# Patient Record
Sex: MALE | URBAN - METROPOLITAN AREA
[De-identification: names, ages, dates, MRNs, and addresses within clinical notes are randomized per-mention and may not be internally consistent; named-entity substitution may affect disease eponyms.]

---

## 2018-11-12 PROBLEM — Z00.00 ENCOUNTER FOR PREVENTIVE HEALTH EXAMINATION: Status: ACTIVE | Noted: 2018-11-12

## 2019-03-15 ENCOUNTER — EMERGENCY (EMERGENCY)
Facility: HOSPITAL | Age: 54
LOS: 1 days | Discharge: HOME | End: 2019-03-15
Attending: EMERGENCY MEDICINE
Payer: COMMERCIAL

## 2019-03-15 VITALS
DIASTOLIC BLOOD PRESSURE: 105 MMHG | TEMPERATURE: 98 F | HEART RATE: 88 BPM | WEIGHT: 160.06 LBS | RESPIRATION RATE: 19 BRPM | SYSTOLIC BLOOD PRESSURE: 153 MMHG | OXYGEN SATURATION: 97 %

## 2019-03-15 VITALS
DIASTOLIC BLOOD PRESSURE: 91 MMHG | SYSTOLIC BLOOD PRESSURE: 141 MMHG | TEMPERATURE: 98 F | OXYGEN SATURATION: 99 % | HEART RATE: 84 BPM | RESPIRATION RATE: 20 BRPM

## 2019-03-15 LAB
ALBUMIN SERPL ELPH-MCNC: 3.9 G/DL — SIGNIFICANT CHANGE UP (ref 3.3–5)
ALP SERPL-CCNC: 43 U/L — SIGNIFICANT CHANGE UP (ref 40–120)
ALT FLD-CCNC: 12 U/L — SIGNIFICANT CHANGE UP (ref 10–45)
ANION GAP SERPL CALC-SCNC: 13 MMOL/L — SIGNIFICANT CHANGE UP (ref 5–17)
APTT BLD: 30.8 SEC — SIGNIFICANT CHANGE UP (ref 27.5–36.3)
AST SERPL-CCNC: 16 U/L — SIGNIFICANT CHANGE UP (ref 10–40)
BASOPHILS # BLD AUTO: 0.1 K/UL — SIGNIFICANT CHANGE UP (ref 0–0.2)
BASOPHILS NFR BLD AUTO: 1.2 % — SIGNIFICANT CHANGE UP (ref 0–2)
BILIRUB SERPL-MCNC: 0.2 MG/DL — SIGNIFICANT CHANGE UP (ref 0.2–1.2)
BUN SERPL-MCNC: 22 MG/DL — SIGNIFICANT CHANGE UP (ref 7–23)
CALCIUM SERPL-MCNC: 9.8 MG/DL — SIGNIFICANT CHANGE UP (ref 8.4–10.5)
CHLORIDE SERPL-SCNC: 103 MMOL/L — SIGNIFICANT CHANGE UP (ref 96–108)
CK SERPL-CCNC: 39 U/L — SIGNIFICANT CHANGE UP (ref 30–200)
CO2 SERPL-SCNC: 22 MMOL/L — SIGNIFICANT CHANGE UP (ref 22–31)
CREAT SERPL-MCNC: 2.15 MG/DL — HIGH (ref 0.5–1.3)
EOSINOPHIL # BLD AUTO: 0.1 K/UL — SIGNIFICANT CHANGE UP (ref 0–0.5)
EOSINOPHIL NFR BLD AUTO: 1.9 % — SIGNIFICANT CHANGE UP (ref 0–6)
GLUCOSE SERPL-MCNC: 106 MG/DL — HIGH (ref 70–99)
HCT VFR BLD CALC: 45.7 % — SIGNIFICANT CHANGE UP (ref 39–50)
HGB BLD-MCNC: 15.5 G/DL — SIGNIFICANT CHANGE UP (ref 13–17)
INR BLD: 0.95 RATIO — SIGNIFICANT CHANGE UP (ref 0.88–1.16)
LYMPHOCYTES # BLD AUTO: 2.2 K/UL — SIGNIFICANT CHANGE UP (ref 1–3.3)
LYMPHOCYTES # BLD AUTO: 29.8 % — SIGNIFICANT CHANGE UP (ref 13–44)
MCHC RBC-ENTMCNC: 28.2 PG — SIGNIFICANT CHANGE UP (ref 27–34)
MCHC RBC-ENTMCNC: 33.9 GM/DL — SIGNIFICANT CHANGE UP (ref 32–36)
MCV RBC AUTO: 83.1 FL — SIGNIFICANT CHANGE UP (ref 80–100)
MONOCYTES # BLD AUTO: 0.4 K/UL — SIGNIFICANT CHANGE UP (ref 0–0.9)
MONOCYTES NFR BLD AUTO: 5.9 % — SIGNIFICANT CHANGE UP (ref 2–14)
NEUTROPHILS # BLD AUTO: 4.4 K/UL — SIGNIFICANT CHANGE UP (ref 1.8–7.4)
NEUTROPHILS NFR BLD AUTO: 61.2 % — SIGNIFICANT CHANGE UP (ref 43–77)
NT-PROBNP SERPL-SCNC: 271 PG/ML — SIGNIFICANT CHANGE UP (ref 0–300)
PLATELET # BLD AUTO: 267 K/UL — SIGNIFICANT CHANGE UP (ref 150–400)
POTASSIUM SERPL-MCNC: 4.6 MMOL/L — SIGNIFICANT CHANGE UP (ref 3.5–5.3)
POTASSIUM SERPL-SCNC: 4.6 MMOL/L — SIGNIFICANT CHANGE UP (ref 3.5–5.3)
PROT SERPL-MCNC: 6.6 G/DL — SIGNIFICANT CHANGE UP (ref 6–8.3)
PROTHROM AB SERPL-ACNC: 10.8 SEC — SIGNIFICANT CHANGE UP (ref 10–12.9)
RBC # BLD: 5.5 M/UL — SIGNIFICANT CHANGE UP (ref 4.2–5.8)
RBC # FLD: 12.9 % — SIGNIFICANT CHANGE UP (ref 10.3–14.5)
SODIUM SERPL-SCNC: 138 MMOL/L — SIGNIFICANT CHANGE UP (ref 135–145)
TROPONIN T, HIGH SENSITIVITY RESULT: 12 NG/L — SIGNIFICANT CHANGE UP (ref 0–51)
TROPONIN T, HIGH SENSITIVITY RESULT: 12 NG/L — SIGNIFICANT CHANGE UP (ref 0–51)
WBC # BLD: 7.2 K/UL — SIGNIFICANT CHANGE UP (ref 3.8–10.5)
WBC # FLD AUTO: 7.2 K/UL — SIGNIFICANT CHANGE UP (ref 3.8–10.5)

## 2019-03-15 PROCEDURE — 94640 AIRWAY INHALATION TREATMENT: CPT

## 2019-03-15 PROCEDURE — 85730 THROMBOPLASTIN TIME PARTIAL: CPT

## 2019-03-15 PROCEDURE — 71045 X-RAY EXAM CHEST 1 VIEW: CPT | Mod: 26

## 2019-03-15 PROCEDURE — 85027 COMPLETE CBC AUTOMATED: CPT

## 2019-03-15 PROCEDURE — 85610 PROTHROMBIN TIME: CPT

## 2019-03-15 PROCEDURE — 84484 ASSAY OF TROPONIN QUANT: CPT

## 2019-03-15 PROCEDURE — 93010 ELECTROCARDIOGRAM REPORT: CPT

## 2019-03-15 PROCEDURE — 71045 X-RAY EXAM CHEST 1 VIEW: CPT

## 2019-03-15 PROCEDURE — 93005 ELECTROCARDIOGRAM TRACING: CPT

## 2019-03-15 PROCEDURE — 82550 ASSAY OF CK (CPK): CPT

## 2019-03-15 PROCEDURE — 99285 EMERGENCY DEPT VISIT HI MDM: CPT | Mod: 25

## 2019-03-15 PROCEDURE — 80053 COMPREHEN METABOLIC PANEL: CPT

## 2019-03-15 PROCEDURE — 99284 EMERGENCY DEPT VISIT MOD MDM: CPT | Mod: 25

## 2019-03-15 PROCEDURE — 83880 ASSAY OF NATRIURETIC PEPTIDE: CPT

## 2019-03-15 RX ORDER — IPRATROPIUM/ALBUTEROL SULFATE 18-103MCG
3 AEROSOL WITH ADAPTER (GRAM) INHALATION ONCE
Qty: 0 | Refills: 0 | Status: COMPLETED | OUTPATIENT
Start: 2019-03-15 | End: 2019-03-15

## 2019-03-15 RX ADMIN — Medication 3 MILLILITER(S): at 16:17

## 2019-03-15 NOTE — ED PROVIDER NOTE - NSFOLLOWUPCLINICS_GEN_ALL_ED_FT
[facility]  Cardiology  [address] [place] [address]  Phone: [phone]  Fax:   Follow Up Time: 1-3 Days

## 2019-03-15 NOTE — ED PROVIDER NOTE - NSFOLLOWUPINSTRUCTIONS_ED_ALL_ED_FT
This is a recent snapshot of the patient's [facility] medical record.  For current drug dose and complete information and questions, call [phone] or In Basket pool, fv home infusion ([id number])  CSN Number:  [id number]      
Follow up with your cardiologist tomorrow or ours. return to the ER Immediately for worsening pain, shortness of breath, or weakness. STOP SMOKING

## 2019-03-15 NOTE — ED PROVIDER NOTE - PROGRESS NOTE DETAILS
MEds: Janumet, Crestor, Fenofibrate, Glipizide, Clonidine, Losartan, Plavix, Flomax, Dr. James Jones, [phone]. Depakote 250 mg, Benztropine 1 mg, Aristada 662 mg inj Dr. Rich Martinez, [phone] discussed results with patient. Discussed with patient that we would like to admit him for ACS work up. That even though his troponin is negative he can still need a stent. Patient would like to be discharged. Patient feeling better.  Will call his cardiologist tomorrow. Discussed reasons to return Immediately. Understands risk

## 2019-03-15 NOTE — ED PROVIDER NOTE - ATTENDING CONTRIBUTION TO CARE
Patient is a DM, HTN, hyperlipidemia, CAd with hx of MI here for chest pain since last night. He states he woke up with chest pain and it resolved. Pain was left sided, sharp.       heavy smoker, heavy coffe drinker Patient is a DM, HTN, hyperlipidemia, CAd with hx of MI here for chest pain since last night around 2:30/3 pm, lasted about 5 minutes. Pain was left sided, sharp. He went to a doctor today and was told to come to the emergency department. Patient is a heavy smoker, smokes about 1.5 ppd and admits to being non-compliant with medications. he is c/o feeling short of breath with walking. Denies fevers.     VS noted  Gen. no acute distress, Non toxic   HEENT: EOMI, mmm,   Lungs: poor air entry, mild wheeze  CVS: RRR   Abd; Soft non tender, non distended   Ext: no edema  Skin: no rash  Neuro AAOx3 non focal clear speech  a/p: chest pain - resolved . Will check ekg, labs including trop. sob - suspect related to smoking, likely copd/ bronchitis. Will try nebs, check CXR. Given risk factors and non-compliance, would recommend admission for cardiac workup and testing.   - Nima LOVING

## 2019-03-15 NOTE — ED PROVIDER NOTE - CLINICAL SUMMARY MEDICAL DECISION MAKING FREE TEXT BOX
52 yo M heavy smoker with pmhx MI, stents(CAD), dm, htn, and hld presenting with chest pain x last night. Patient currently asymptomatic. Patient given ASA at urgent care. Will obtain ekg, cxr, cardiac monitoring, and labs

## 2019-03-15 NOTE — ED PROVIDER NOTE - OBJECTIVE STATEMENT
54 yo M heavy smoker with pmhx MI, stents(CAD), dm, htn, and hld presenting with chest pain x last night. Patient states he began in the middle of the night with left sided chest pain that was sharp in nature. Patient states he was able to relax and the pain went away and he went back to sleep. Patient states he drank "a lot of coffee and smoked a lot" this morning and the pain began again. Patient went to urgent care and was given a full asa. Patient states he took all his medication last night. Patient currently asymptomatic. Patient admits to cough but always coughs. Patient denies fever, sob. neck pain, back pain, tingling, numbness, weakness, abd pain, nvd, dysuria, hematuria, calf pain and edema 54 yo M heavy smoker with pmhx MI, stents(CAD), dm, htn, and hld presenting with chest pain x last night. Patient states he began in the middle of the night with left sided chest pain that was sharp in nature. Patient states he was able to relax and the pain went away and he went back to sleep. Patient states he drank "a lot of coffee and smoked a lot" this morning and the pain began again. Patient went to urgent care and was given a full asa. Patient states he took all his medication last night. Patient currently asymptomatic. Patient admits to cough but always coughs. Patient also admits to sob worse on exertion. Patient denies fever,  neck pain, back pain, tingling, numbness, weakness, abd pain, nvd, dysuria, hematuria, calf pain and edema

## 2019-03-15 NOTE — ED ADULT NURSE NOTE - NSIMPLEMENTINTERV_GEN_ALL_ED
Implemented All Fall with Harm Risk Interventions:  [place] to call system. Call bell, personal items and telephone within reach. Instruct patient to call for assistance. Room bathroom lighting operational. Non-slip footwear when patient is off stretcher. Physically safe environment: no spills, clutter or unnecessary equipment. Stretcher in lowest position, wheels locked, appropriate side rails in place. Provide visual cue, wrist band, yellow gown, etc. Monitor gait and stability. Monitor for mental status changes and reorient to person, place, and time. Review medications for side effects contributing to fall risk. Reinforce activity limits and safety measures with patient and family. Provide visual clues: red socks.

## 2019-03-15 NOTE — ED ADULT NURSE NOTE - OBJECTIVE STATEMENT
53y male brought in by EMS from [facility] c/o chest pain. A&Ox3 and hypertensive. [place] speaking. Hx of HTN, DM, HLD, BPH and MI and cardiac arrest. Pt report sharp non-radiating left sided chest pain last night. Went to sleep, woke up in the AM and had coffee. Then noted dull left sided chest pain and palpitations. Pt presented to [facility] where found to be hypertensive 230/120 and tachycardic 120bpm. [facility] place 20g IV in RAC and administer 325mg PO aspirin. Denies sob/diaphoresis and n/v/d. EKG performed. Cardiac monitor applied.

## 2024-04-29 PROBLEM — I25.10 ATHEROSCLEROTIC HEART DISEASE OF NATIVE CORONARY ARTERY WITHOUT ANGINA PECTORIS: Chronic | Status: ACTIVE | Noted: 2019-03-19

## 2024-04-29 PROBLEM — E11.9 TYPE 2 DIABETES MELLITUS WITHOUT COMPLICATIONS: Chronic | Status: ACTIVE | Noted: 2019-03-19

## 2024-04-29 PROBLEM — E78.5 HYPERLIPIDEMIA, UNSPECIFIED: Chronic | Status: ACTIVE | Noted: 2019-03-19

## 2024-04-29 PROBLEM — I10 ESSENTIAL (PRIMARY) HYPERTENSION: Chronic | Status: ACTIVE | Noted: 2019-03-19

## 2024-06-19 ENCOUNTER — APPOINTMENT (OUTPATIENT)
Dept: OTOLARYNGOLOGY | Facility: CLINIC | Age: 59
End: 2024-06-19

## 2024-07-03 ENCOUNTER — NON-APPOINTMENT (OUTPATIENT)
Age: 59
End: 2024-07-03

## 2024-07-31 ENCOUNTER — APPOINTMENT (OUTPATIENT)
Dept: OTOLARYNGOLOGY | Facility: CLINIC | Age: 59
End: 2024-07-31

## 2025-07-12 ENCOUNTER — INPATIENT (INPATIENT)
Facility: HOSPITAL | Age: 60
LOS: 3 days | Discharge: HOME HEALTH | End: 2025-07-16
Attending: INTERNAL MEDICINE | Admitting: INTERNAL MEDICINE
Payer: MEDICARE

## 2025-07-12 ENCOUNTER — TRANSCRIPTION ENCOUNTER (OUTPATIENT)
Age: 60
End: 2025-07-12

## 2025-07-12 ENCOUNTER — RESULT REVIEW (OUTPATIENT)
Age: 60
End: 2025-07-12

## 2025-07-12 VITALS
WEIGHT: 100.09 LBS | HEART RATE: 130 BPM | HEIGHT: 66 IN | DIASTOLIC BLOOD PRESSURE: 148 MMHG | OXYGEN SATURATION: 88 % | RESPIRATION RATE: 35 BRPM | SYSTOLIC BLOOD PRESSURE: 247 MMHG | TEMPERATURE: 102 F

## 2025-07-12 LAB
ALBUMIN SERPL ELPH-MCNC: 4 G/DL — SIGNIFICANT CHANGE UP (ref 3.3–5)
ALP SERPL-CCNC: 87 U/L — SIGNIFICANT CHANGE UP (ref 40–120)
ALT FLD-CCNC: 20 U/L — SIGNIFICANT CHANGE UP (ref 12–78)
ANION GAP SERPL CALC-SCNC: 12 MMOL/L — SIGNIFICANT CHANGE UP (ref 5–17)
ANION GAP SERPL CALC-SCNC: 9 MMOL/L — SIGNIFICANT CHANGE UP (ref 5–17)
ANISOCYTOSIS BLD QL: SLIGHT — SIGNIFICANT CHANGE UP
APTT BLD: 32.9 SEC — SIGNIFICANT CHANGE UP (ref 26.1–36.8)
AST SERPL-CCNC: 17 U/L — SIGNIFICANT CHANGE UP (ref 15–37)
BASE EXCESS BLDA CALC-SCNC: -0.7 MMOL/L — SIGNIFICANT CHANGE UP (ref -2–3)
BASOPHILS # BLD AUTO: 0 K/UL — SIGNIFICANT CHANGE UP (ref 0–0.2)
BASOPHILS NFR BLD AUTO: 0 % — SIGNIFICANT CHANGE UP (ref 0–2)
BILIRUB SERPL-MCNC: 0.4 MG/DL — SIGNIFICANT CHANGE UP (ref 0.2–1.2)
BLOOD GAS COMMENTS ARTERIAL: SIGNIFICANT CHANGE UP
BUN SERPL-MCNC: 22 MG/DL — SIGNIFICANT CHANGE UP (ref 7–23)
BUN SERPL-MCNC: 23 MG/DL — SIGNIFICANT CHANGE UP (ref 7–23)
CALCIUM SERPL-MCNC: 8.5 MG/DL — SIGNIFICANT CHANGE UP (ref 8.5–10.1)
CALCIUM SERPL-MCNC: 9.9 MG/DL — SIGNIFICANT CHANGE UP (ref 8.5–10.1)
CHLORIDE SERPL-SCNC: 97 MMOL/L — SIGNIFICANT CHANGE UP (ref 96–108)
CHLORIDE SERPL-SCNC: 97 MMOL/L — SIGNIFICANT CHANGE UP (ref 96–108)
CO2 BLDA-SCNC: 29 MMOL/L — HIGH (ref 19–24)
CO2 SERPL-SCNC: 25 MMOL/L — SIGNIFICANT CHANGE UP (ref 22–31)
CO2 SERPL-SCNC: 25 MMOL/L — SIGNIFICANT CHANGE UP (ref 22–31)
CREAT SERPL-MCNC: 6.22 MG/DL — HIGH (ref 0.5–1.3)
CREAT SERPL-MCNC: 6.33 MG/DL — HIGH (ref 0.5–1.3)
EGFR: 10 ML/MIN/1.73M2 — LOW
EGFR: 10 ML/MIN/1.73M2 — LOW
EGFR: 9 ML/MIN/1.73M2 — LOW
EGFR: 9 ML/MIN/1.73M2 — LOW
EOSINOPHIL # BLD AUTO: 0.51 K/UL — HIGH (ref 0–0.5)
EOSINOPHIL NFR BLD AUTO: 5 % — SIGNIFICANT CHANGE UP (ref 0–6)
FLUAV AG NPH QL: SIGNIFICANT CHANGE UP
FLUBV AG NPH QL: SIGNIFICANT CHANGE UP
GAS PNL BLDA: SIGNIFICANT CHANGE UP
GAS PNL BLDA: SIGNIFICANT CHANGE UP
GLUCOSE SERPL-MCNC: 124 MG/DL — HIGH (ref 70–99)
GLUCOSE SERPL-MCNC: 125 MG/DL — HIGH (ref 70–99)
GRAM STN FLD: ABNORMAL
HCO3 BLDA-SCNC: 35 MMOL/L — HIGH (ref 21–28)
HCT VFR BLD CALC: 44.6 % — SIGNIFICANT CHANGE UP (ref 39–50)
HGB BLD-MCNC: 14.1 G/DL — SIGNIFICANT CHANGE UP (ref 13–17)
HOROWITZ INDEX BLDA+IHG-RTO: 0.6 — SIGNIFICANT CHANGE UP
INR BLD: 0.94 RATIO — SIGNIFICANT CHANGE UP (ref 0.85–1.16)
LACTATE SERPL-SCNC: 2.2 MMOL/L — HIGH (ref 0.7–2)
LACTATE SERPL-SCNC: 5.4 MMOL/L — CRITICAL HIGH (ref 0.7–2)
LYMPHOCYTES # BLD AUTO: 4.89 K/UL — HIGH (ref 1–3.3)
LYMPHOCYTES # BLD AUTO: 48 % — HIGH (ref 13–44)
MAGNESIUM SERPL-MCNC: 2.4 MG/DL — SIGNIFICANT CHANGE UP (ref 1.6–2.6)
MANUAL SMEAR VERIFICATION: SIGNIFICANT CHANGE UP
MCHC RBC-ENTMCNC: 28.8 PG — SIGNIFICANT CHANGE UP (ref 27–34)
MCHC RBC-ENTMCNC: 31.6 G/DL — LOW (ref 32–36)
MCV RBC AUTO: 91.2 FL — SIGNIFICANT CHANGE UP (ref 80–100)
MICROCYTES BLD QL: SLIGHT — SIGNIFICANT CHANGE UP
MONOCYTES # BLD AUTO: 0.31 K/UL — SIGNIFICANT CHANGE UP (ref 0–0.9)
MONOCYTES NFR BLD AUTO: 3 % — SIGNIFICANT CHANGE UP (ref 2–14)
MRSA PCR RESULT.: SIGNIFICANT CHANGE UP
NEUTROPHILS # BLD AUTO: 4.48 K/UL — SIGNIFICANT CHANGE UP (ref 1.8–7.4)
NEUTROPHILS NFR BLD AUTO: 44 % — SIGNIFICANT CHANGE UP (ref 43–77)
NRBC # BLD: 0 /100 WBCS — SIGNIFICANT CHANGE UP (ref 0–0)
NRBC BLD AUTO-RTO: SIGNIFICANT CHANGE UP /100 WBCS (ref 0–0)
NRBC BLD-RTO: 0 /100 WBCS — SIGNIFICANT CHANGE UP (ref 0–0)
NT-PROBNP SERPL-SCNC: HIGH PG/ML (ref 0–125)
PCO2 BLDA: 61 MMHG — HIGH (ref 32–46)
PH BLDA: 7.26 — LOW (ref 7.35–7.45)
PHOSPHATE SERPL-MCNC: 4 MG/DL — SIGNIFICANT CHANGE UP (ref 2.5–4.5)
PLAT MORPH BLD: NORMAL — SIGNIFICANT CHANGE UP
PLATELET # BLD AUTO: 216 K/UL — SIGNIFICANT CHANGE UP (ref 150–400)
PO2 BLDA: 69 MMHG — LOW (ref 83–108)
POTASSIUM SERPL-MCNC: 5.5 MMOL/L — HIGH (ref 3.5–5.3)
POTASSIUM SERPL-MCNC: 5.7 MMOL/L — HIGH (ref 3.5–5.3)
POTASSIUM SERPL-SCNC: 5.5 MMOL/L — HIGH (ref 3.5–5.3)
POTASSIUM SERPL-SCNC: 5.7 MMOL/L — HIGH (ref 3.5–5.3)
PROT SERPL-MCNC: 8.5 GM/DL — HIGH (ref 6–8.3)
PROTHROM AB SERPL-ACNC: 10.9 SEC — SIGNIFICANT CHANGE UP (ref 9.9–13.4)
RBC # BLD: 4.89 M/UL — SIGNIFICANT CHANGE UP (ref 4.2–5.8)
RBC # FLD: 14.5 % — SIGNIFICANT CHANGE UP (ref 10.3–14.5)
RBC BLD AUTO: ABNORMAL
RSV RNA NPH QL NAA+NON-PROBE: SIGNIFICANT CHANGE UP
S AUREUS DNA NOSE QL NAA+PROBE: SIGNIFICANT CHANGE UP
SAO2 % BLDA: 93 % — LOW (ref 94–98)
SARS-COV-2 RNA SPEC QL NAA+PROBE: SIGNIFICANT CHANGE UP
SODIUM SERPL-SCNC: 131 MMOL/L — LOW (ref 135–145)
SODIUM SERPL-SCNC: 134 MMOL/L — LOW (ref 135–145)
SOURCE RESPIRATORY: SIGNIFICANT CHANGE UP
SPECIMEN SOURCE: SIGNIFICANT CHANGE UP
TROPONIN I, HIGH SENSITIVITY RESULT: 106.6 NG/L — HIGH
TROPONIN I, HIGH SENSITIVITY RESULT: 27.4 NG/L — SIGNIFICANT CHANGE UP
WBC # BLD: 10.18 K/UL — SIGNIFICANT CHANGE UP (ref 3.8–10.5)
WBC # FLD AUTO: 10.18 K/UL — SIGNIFICANT CHANGE UP (ref 3.8–10.5)

## 2025-07-12 PROCEDURE — 99291 CRITICAL CARE FIRST HOUR: CPT

## 2025-07-12 PROCEDURE — 31500 INSERT EMERGENCY AIRWAY: CPT

## 2025-07-12 PROCEDURE — 93306 TTE W/DOPPLER COMPLETE: CPT | Mod: 26

## 2025-07-12 PROCEDURE — 70450 CT HEAD/BRAIN W/O DYE: CPT | Mod: 26

## 2025-07-12 PROCEDURE — 99291 CRITICAL CARE FIRST HOUR: CPT | Mod: 25

## 2025-07-12 PROCEDURE — 71045 X-RAY EXAM CHEST 1 VIEW: CPT | Mod: 26,77

## 2025-07-12 PROCEDURE — 74176 CT ABD & PELVIS W/O CONTRAST: CPT | Mod: 26

## 2025-07-12 PROCEDURE — 71045 X-RAY EXAM CHEST 1 VIEW: CPT | Mod: 26

## 2025-07-12 PROCEDURE — 93010 ELECTROCARDIOGRAM REPORT: CPT

## 2025-07-12 PROCEDURE — 71250 CT THORAX DX C-: CPT | Mod: 26

## 2025-07-12 RX ORDER — ONDANSETRON HCL/PF 4 MG/2 ML
4 VIAL (ML) INJECTION ONCE
Refills: 0 | Status: COMPLETED | OUTPATIENT
Start: 2025-07-12 | End: 2025-07-12

## 2025-07-12 RX ORDER — SENNA 187 MG
2 TABLET ORAL AT BEDTIME
Refills: 0 | Status: DISCONTINUED | OUTPATIENT
Start: 2025-07-12 | End: 2025-07-16

## 2025-07-12 RX ORDER — IPRATROPIUM BROMIDE AND ALBUTEROL SULFATE .5; 2.5 MG/3ML; MG/3ML
3 SOLUTION RESPIRATORY (INHALATION) EVERY 6 HOURS
Refills: 0 | Status: DISCONTINUED | OUTPATIENT
Start: 2025-07-12 | End: 2025-07-15

## 2025-07-12 RX ORDER — ASPIRIN 325 MG
1 TABLET ORAL
Refills: 0 | DISCHARGE

## 2025-07-12 RX ORDER — FENTANYL CITRATE-0.9 % NACL/PF 100MCG/2ML
0.5 SYRINGE (ML) INTRAVENOUS
Qty: 2500 | Refills: 0 | Status: DISCONTINUED | OUTPATIENT
Start: 2025-07-12 | End: 2025-07-12

## 2025-07-12 RX ORDER — HEPARIN SODIUM 1000 [USP'U]/ML
5000 INJECTION INTRAVENOUS; SUBCUTANEOUS EVERY 8 HOURS
Refills: 0 | Status: DISCONTINUED | OUTPATIENT
Start: 2025-07-12 | End: 2025-07-16

## 2025-07-12 RX ORDER — POLYETHYLENE GLYCOL 3350 17 G/17G
17 POWDER, FOR SOLUTION ORAL DAILY
Refills: 0 | Status: DISCONTINUED | OUTPATIENT
Start: 2025-07-12 | End: 2025-07-16

## 2025-07-12 RX ORDER — SUCCINYLCHOLINE CHLORIDE 20 MG/ML
100 VIAL (ML) INJECTION ONCE
Refills: 0 | Status: COMPLETED | OUTPATIENT
Start: 2025-07-12 | End: 2025-07-12

## 2025-07-12 RX ORDER — ATORVASTATIN CALCIUM 80 MG/1
1 TABLET, FILM COATED ORAL
Refills: 0 | DISCHARGE

## 2025-07-12 RX ORDER — FENTANYL CITRATE-0.9 % NACL/PF 100MCG/2ML
50 SYRINGE (ML) INTRAVENOUS
Refills: 0 | Status: DISCONTINUED | OUTPATIENT
Start: 2025-07-12 | End: 2025-07-13

## 2025-07-12 RX ORDER — NICOTINE POLACRILEX 4 MG/1
1 GUM, CHEWING ORAL DAILY
Refills: 0 | Status: DISCONTINUED | OUTPATIENT
Start: 2025-07-12 | End: 2025-07-16

## 2025-07-12 RX ORDER — VANCOMYCIN HCL IN 5 % DEXTROSE 1.5G/250ML
1000 PLASTIC BAG, INJECTION (ML) INTRAVENOUS ONCE
Refills: 0 | Status: COMPLETED | OUTPATIENT
Start: 2025-07-12 | End: 2025-07-12

## 2025-07-12 RX ORDER — TAMSULOSIN HYDROCHLORIDE 0.4 MG/1
1 CAPSULE ORAL
Refills: 0 | DISCHARGE

## 2025-07-12 RX ORDER — PIPERACILLIN-TAZO-DEXTROSE,ISO 3.375G/5
3.38 IV SOLUTION, PIGGYBACK PREMIX FROZEN(ML) INTRAVENOUS EVERY 12 HOURS
Refills: 0 | Status: DISCONTINUED | OUTPATIENT
Start: 2025-07-12 | End: 2025-07-12

## 2025-07-12 RX ORDER — SODIUM ZIRCONIUM CYCLOSILICATE 5 G/5G
10 POWDER, FOR SUSPENSION ORAL ONCE
Refills: 0 | Status: COMPLETED | OUTPATIENT
Start: 2025-07-12 | End: 2025-07-12

## 2025-07-12 RX ORDER — AZITHROMYCIN 250 MG
500 CAPSULE ORAL EVERY 24 HOURS
Refills: 0 | Status: DISCONTINUED | OUTPATIENT
Start: 2025-07-12 | End: 2025-07-13

## 2025-07-12 RX ORDER — NITROGLYCERIN 20 MG/G
5 OINTMENT TOPICAL
Qty: 50 | Refills: 0 | Status: DISCONTINUED | OUTPATIENT
Start: 2025-07-12 | End: 2025-07-12

## 2025-07-12 RX ORDER — PIPERACILLIN-TAZO-DEXTROSE,ISO 3.375G/5
3.38 IV SOLUTION, PIGGYBACK PREMIX FROZEN(ML) INTRAVENOUS EVERY 12 HOURS
Refills: 0 | Status: DISCONTINUED | OUTPATIENT
Start: 2025-07-12 | End: 2025-07-16

## 2025-07-12 RX ORDER — ETOMIDATE 2 MG/ML
20 AMPUL (ML) INTRAVENOUS ONCE
Refills: 0 | Status: COMPLETED | OUTPATIENT
Start: 2025-07-12 | End: 2025-07-12

## 2025-07-12 RX ORDER — PROPOFOL 10 MG/ML
10 INJECTION, EMULSION INTRAVENOUS
Qty: 1000 | Refills: 0 | Status: DISCONTINUED | OUTPATIENT
Start: 2025-07-12 | End: 2025-07-13

## 2025-07-12 RX ORDER — ACETAMINOPHEN 500 MG/5ML
675 LIQUID (ML) ORAL ONCE
Refills: 0 | Status: COMPLETED | OUTPATIENT
Start: 2025-07-12 | End: 2025-07-12

## 2025-07-12 RX ORDER — PIPERACILLIN-TAZO-DEXTROSE,ISO 3.375G/5
3.38 IV SOLUTION, PIGGYBACK PREMIX FROZEN(ML) INTRAVENOUS ONCE
Refills: 0 | Status: COMPLETED | OUTPATIENT
Start: 2025-07-12 | End: 2025-07-12

## 2025-07-12 RX ORDER — ARIPIPRAZOLE 2 MG/1
1 TABLET ORAL
Refills: 0 | DISCHARGE

## 2025-07-12 RX ADMIN — PROPOFOL 2.72 MICROGRAM(S)/KG/MIN: 10 INJECTION, EMULSION INTRAVENOUS at 07:28

## 2025-07-12 RX ADMIN — Medication 4 MILLIGRAM(S): at 02:06

## 2025-07-12 RX ADMIN — SODIUM ZIRCONIUM CYCLOSILICATE 10 GRAM(S): 5 POWDER, FOR SUSPENSION ORAL at 08:54

## 2025-07-12 RX ADMIN — NICOTINE POLACRILEX 1 PATCH: 4 GUM, CHEWING ORAL at 11:25

## 2025-07-12 RX ADMIN — Medication 100 MILLIGRAM(S): at 02:17

## 2025-07-12 RX ADMIN — PROPOFOL 2.72 MICROGRAM(S)/KG/MIN: 10 INJECTION, EMULSION INTRAVENOUS at 05:44

## 2025-07-12 RX ADMIN — PROPOFOL 2.72 MICROGRAM(S)/KG/MIN: 10 INJECTION, EMULSION INTRAVENOUS at 19:38

## 2025-07-12 RX ADMIN — Medication 2.27 MICROGRAM(S)/KG/HR: at 05:44

## 2025-07-12 RX ADMIN — Medication 255 MILLIGRAM(S): at 08:54

## 2025-07-12 RX ADMIN — PROPOFOL 2.72 MICROGRAM(S)/KG/MIN: 10 INJECTION, EMULSION INTRAVENOUS at 02:34

## 2025-07-12 RX ADMIN — Medication 50 MICROGRAM(S): at 20:30

## 2025-07-12 RX ADMIN — Medication 25 GRAM(S): at 09:59

## 2025-07-12 RX ADMIN — Medication 250 MILLIGRAM(S): at 03:33

## 2025-07-12 RX ADMIN — Medication 2.27 MICROGRAM(S)/KG/HR: at 07:28

## 2025-07-12 RX ADMIN — NITROGLYCERIN 1.5 MICROGRAM(S)/MIN: 20 OINTMENT TOPICAL at 02:04

## 2025-07-12 RX ADMIN — Medication 2.27 MICROGRAM(S)/KG/HR: at 03:45

## 2025-07-12 RX ADMIN — Medication 2 TABLET(S): at 22:47

## 2025-07-12 RX ADMIN — HEPARIN SODIUM 5000 UNIT(S): 1000 INJECTION INTRAVENOUS; SUBCUTANEOUS at 13:31

## 2025-07-12 RX ADMIN — Medication 15 MILLILITER(S): at 05:59

## 2025-07-12 RX ADMIN — Medication 270 MILLIGRAM(S): at 02:58

## 2025-07-12 RX ADMIN — HEPARIN SODIUM 5000 UNIT(S): 1000 INJECTION INTRAVENOUS; SUBCUTANEOUS at 22:43

## 2025-07-12 RX ADMIN — Medication 25 GRAM(S): at 22:45

## 2025-07-12 RX ADMIN — PROPOFOL 2.72 MICROGRAM(S)/KG/MIN: 10 INJECTION, EMULSION INTRAVENOUS at 06:32

## 2025-07-12 RX ADMIN — Medication 1 APPLICATION(S): at 05:44

## 2025-07-12 RX ADMIN — Medication 20 MILLIGRAM(S): at 02:17

## 2025-07-12 RX ADMIN — Medication 50 MICROGRAM(S): at 19:50

## 2025-07-12 RX ADMIN — NITROGLYCERIN 1.5 MICROGRAM(S)/MIN: 20 OINTMENT TOPICAL at 02:16

## 2025-07-12 RX ADMIN — PROPOFOL 2.72 MICROGRAM(S)/KG/MIN: 10 INJECTION, EMULSION INTRAVENOUS at 19:50

## 2025-07-12 RX ADMIN — IPRATROPIUM BROMIDE AND ALBUTEROL SULFATE 3 MILLILITER(S): .5; 2.5 SOLUTION RESPIRATORY (INHALATION) at 23:04

## 2025-07-12 RX ADMIN — IPRATROPIUM BROMIDE AND ALBUTEROL SULFATE 3 MILLILITER(S): .5; 2.5 SOLUTION RESPIRATORY (INHALATION) at 17:22

## 2025-07-12 RX ADMIN — POLYETHYLENE GLYCOL 3350 17 GRAM(S): 17 POWDER, FOR SOLUTION ORAL at 11:24

## 2025-07-12 RX ADMIN — PROPOFOL 2.72 MICROGRAM(S)/KG/MIN: 10 INJECTION, EMULSION INTRAVENOUS at 13:30

## 2025-07-12 RX ADMIN — Medication 200 GRAM(S): at 03:10

## 2025-07-12 RX ADMIN — NITROGLYCERIN 1.5 MICROGRAM(S)/MIN: 20 OINTMENT TOPICAL at 05:44

## 2025-07-12 RX ADMIN — Medication 40 MILLIGRAM(S): at 11:25

## 2025-07-12 RX ADMIN — IPRATROPIUM BROMIDE AND ALBUTEROL SULFATE 3 MILLILITER(S): .5; 2.5 SOLUTION RESPIRATORY (INHALATION) at 11:23

## 2025-07-12 RX ADMIN — NICOTINE POLACRILEX 1 PATCH: 4 GUM, CHEWING ORAL at 19:28

## 2025-07-12 RX ADMIN — Medication 15 MILLILITER(S): at 17:04

## 2025-07-12 NOTE — ED ADULT NURSE NOTE - OBJECTIVE STATEMENT
PT BIBA for resp distress. CHF exhacerbation ESRD pt MWF. CPAP on arrival placed on Bipap here. Pt started aspirating. Intubated for Airway protectant. 2.5 ETT 23LL.

## 2025-07-12 NOTE — H&P ADULT - ASSESSMENT
59 year old male with a PMH of HTN, HLD, CAD s/ MI, COPD, Polycystic kidney disease, ESRD on HD T/Th/Sa, and BPH presenting to ICU for tx of:    Hypoxic / Hypercarbic Respiratory Failure  Hypertensive Emergency  Pulmonary Edema / Heart Failure  PNA, + aspiration  ESRD on HD    Plan  Admit to ICU  Propofol gtt and Fentanyl gtt for sedation, goal RASS 0/-1  Maintain ETT, wean vent settings as tolerated, abg prn, daily sat / sbt  Nitro gtt for hypertension initiated in ED, goal SBP ~180 given 250 SBP noted earlier; currently off   s/p empiric zosyn / vanco in ED, will continue; fup cultures  Fup results CT chest / abdomen / pelvis     59 year old male with a PMH of Bipolar dz on abilify, HTN, HLD, CAD s/p MI with PCI (~10 years ago), HF with mildly reduced EF, COPD, Active smoker 1 PPD, Polycystic kidney disease, ESRD on HD T/Th/Sa, and BPH presenting to ICU for tx of:      Hypoxic / Hypercarbic Respiratory Failure  Hypertensive Emergency  Pulmonary Edema / Heart Failure exacerbation  PNA, + aspiration  ESRD on HD      Plan  Admit to ICU  Propofol gtt and Fentanyl gtt for sedation, goal RASS 0/-1; will titrate off fentanyl as tolerated  Maintain ETT, wean vent settings as tolerated, abg prn, daily sat / sbt  Nitro gtt for hypertension initiated in ED, goal SBP ~180 given 250 SBP noted earlier; currently off   s/p empiric zosyn / vanco in ED, will continue; fup cultures  Fup results CT chest / abdomen / pelvis  TTE pending, obtain cardiology consult  NPO for now, monitor blood glucose levels  I/O's  Nephrology consult, patient is due for HD today  Reportedly still does make urine  SQH for DVT prophylaxis  PPI for GI prophylaxis  Nicotine patch for nicotine addiction  Full code status      Discussed above with pt daughter and nephew at the bedside      d/w eicu attending MD      CRITICAL CARE TIME SPENT:  55 minutes of critical care time spent providing medical care for patient's acute illness/conditions that impairs at least one vital organ system and/or poses a high risk of imminent or life threatening deterioration in the patient's condition. It includes time spent evaluating and treating the patient's acute illness as well as time spent reviewing labs, radiology, discussing goals of care with patient and/or patient's family, and discussing the case with a multidisciplinary team, in an effort to prevent further life threatening deterioration or end organ damage. This time is independent of any procedures performed.       59 year old male with a PMH of Bipolar dz on abilify, HTN, HLD, CAD s/p MI with PCI (~10 years ago), HF with mildly reduced EF, COPD, Active smoker 1 PPD, Polycystic kidney disease, ESRD on HD T/Th/Sa, and BPH presenting to ICU for tx of:      Hypoxic / Hypercarbic Respiratory Failure  Hypertensive Emergency  Pulmonary Edema / Heart Failure exacerbation  PNA, + aspiration  ESRD on HD      Plan  Admit to ICU  Propofol gtt and Fentanyl gtt for sedation, goal RASS 0/-1; will titrate off fentanyl as tolerated  Maintain ETT, wean vent settings as tolerated, abg prn, daily sat / sbt  Nitro gtt for hypertension initiated in ED, goal SBP ~180 given 250 SBP noted earlier; currently off   s/p empiric zosyn / vanco in ED, will continue; fup cultures  Fup results CT chest / abdomen / pelvis  TTE pending, obtain cardiology consult  NPO for now, monitor blood glucose levels  I/O's  Nephrology consult, patient is due for HD today  Reportedly still does make urine  SQH for DVT prophylaxis  PPI for GI prophylaxis  Nicotine patch for nicotine addiction  Home  medication regimen as indicated / tolerated  Full code status      Discussed above with pt daughter and nephew at the bedside      d/w eicu attending MD      CRITICAL CARE TIME SPENT:  55 minutes of critical care time spent providing medical care for patient's acute illness/conditions that impairs at least one vital organ system and/or poses a high risk of imminent or life threatening deterioration in the patient's condition. It includes time spent evaluating and treating the patient's acute illness as well as time spent reviewing labs, radiology, discussing goals of care with patient and/or patient's family, and discussing the case with a multidisciplinary team, in an effort to prevent further life threatening deterioration or end organ damage. This time is independent of any procedures performed.

## 2025-07-12 NOTE — PATIENT PROFILE ADULT - NSTOBACCOCESSATIONEDU2_GEN_A_NUR
Body Location Override (Optional - Billing Will Still Be Based On Selected Body Map Location If Applicable): right [place] Detail Level: Detailed Add 52591 Cpt? (Important Note: In 2017 The Use Of 72462 Is Being Tracked By Cms To Determine Future Global Period Reimbursement For Global Periods): no Develop coping skills.  For example, strategies and lifestyle changes that reduce negative moods, stress, and exposure to smoking cues.

## 2025-07-12 NOTE — PROVIDER CONTACT NOTE (EICU) - RECOMMENDATIONS
-continue care in ICU for   -on fentanyl and propofol for sedation, titrate for RASS 0 to -1, can consider switching from propofol due to effects on BP given significant hypertension on presentation   -continue mechanical ventilator support, daily SAT/SBT evaluation   -hypertensive in ED, currently off nitroglycerin drip, continue to monitor BP closely, TTE ordered, initial troponin negative  -ESRD on HD, due for HD today, nephrology consult  -continue antibiotics, follow up cultures  -plan discussed with CC LATRICE Vazquez, please see her documentation for details of bedside evaluation, assessment and plan

## 2025-07-12 NOTE — ED ADULT NURSE NOTE - PAIN: PRESENCE, MLM
denies pain/discomfort (Rating = 0) Nsaids Counseling: NSAID Counseling: I discussed with the patient that NSAIDs should be taken with food. Prolonged use of NSAIDs can result in the development of stomach ulcers.  Patient advised to stop taking NSAIDs if abdominal pain occurs.  The patient verbalized understanding of the proper use and possible adverse effects of NSAIDs.  All of the patient's questions and concerns were addressed.

## 2025-07-12 NOTE — ED ADULT NURSE NOTE - NSFALLRISKFACTORS_ED_ALL_ED
WRITTEN INSTRUCTIONS TO PATIENT/FAMILY:  Patient instruction sheet  Tonsillectomy/Adenoidectomy  
Age: 85 years old or older

## 2025-07-12 NOTE — H&P ADULT - CRITICAL CARE ATTENDING COMMENT
59M w/ bipolar disease, HTN, CAD, HFmrEF, COPD - active smoker 1 PPM, ESRD on HD due to PKD. Presents w/ SOB and cough. In ED, pt hypoxemic and hypertensive and febrile. Placed on BiPAP but had episode of vomiting and possible aspiration requiring for intubation. Imaging reveals bibasilar consolidations.    #Neuro - sedated w/ fentanyl and propofol, SAT today   #CV - HD stable, was HTN in the ED when SOB, now improved, hold all BP meds for now; get TTE  #Pulm - intubated for respiratory distress, now on minimal vent settings, SBT today; duonebs; maintain SpO2 >88%  #ID- currently on zosyn, add azithromycin   #Renal/metabolic -   #GI-   #Heme -   #Endo -  #Skin -   #PPx -   #Dispo-     59 year old male with a PMH of Bipolar dz on abilify, HTN, HLD, CAD s/p MI with PCI (~10 years ago), HF with mildly reduced EF, COPD, Active smoker 1 PPD, Polycystic kidney disease, ESRD on HD T/Th/Sa, and BPH who presented to [facility] ED with c/o cough and SOB for a few days found to be hypoxic by EMS with O2 sat in 60's. While in ED pt noted to be hypertensive with SBP noted into mid 200's. Pt was vomiting and febrile with concern for aspiration. He failed NIV trial and was subsequently intubated. ROS otherwise negative. Last HD was reportedly yesterday with full session completed. Pt accepted to ICU for further management / care. 59M w/ bipolar disease, HTN, CAD, HFmrEF, COPD - active smoker 1 PPM, ESRD on HD due to PKD. Presents w/ SOB and cough. In ED, pt hypoxemic and hypertensive and febrile. Placed on BiPAP but had episode of vomiting and possible aspiration requiring for intubation. Imaging reveals bibasilar consolidations.    #Neuro - sedated w/ fentanyl and propofol, SAT today; nicotine patch  #CV - HD stable, was HTN in the ED when SOB, now improved, hold all BP meds for now; get TTE  #Pulm - intubated for respiratory distress, now on minimal vent settings, SBT today; duonebs; maintain SpO2 >88%  #ID- currently on zosyn, add azithromycin; check sputum cx, urine legionella, strept pneumo ag, procal, mycoplasma; f/u blood cx, MRSA PCR  #Renal/metabolic - ESRD on HD; renal consult for HD  #GI- start TF, bowel regimen; continue PPI  #Heme - no active issues  #Endo - monitor BG  #PPx - HSQ q8  #Dispo- admit to ICU for continued mgmt of respiratory failure; prognosis guarded; full code    Plan discussed w/ pt's daughter and nephew at bedside, all questions answered Principal Discharge DX:	Orthostatic hypotension

## 2025-07-12 NOTE — ED PROVIDER NOTE - CLINICAL SUMMARY MEDICAL DECISION MAKING FREE TEXT BOX
58 y/o M with PMH ESRD on T/Th/Sa dialysis, polycystic kidney disease, HTN, HLD, BPD, BPH, COPD here today with respiratory distress. Per EMS, patient was saturating in the 60s in the field and was placed on CPAP. Patient has been c/o cough and dyspnea for the past few days. In the ED, patient has vomiting and is found to be febrile. Last dialyzed yesterday, full session.    In the ED, he is hypertensive, NTG gtt initiated. He is also febrile and tachycardic. Sepsis orders including broad spectrum abx initiated. Patient is ESRD so will hold fluids as hypertensive.    GENERAL: lethargic, +respiratory distress  HEENT: NC/AT, moist mucous membranes  LUNGS: crackles bilaterally  CARDIAC: RRR, no m/r/g  ABDOMEN: Soft, non tender, non distended, no rebound, no guarding  EXT: No edema, no calf tenderness, no deformities.  NEURO: Confused. Moving all extremities.  SKIN: Warm and dry. No rash.  PSYCH: Normal affect.    Exam as above.  Patient initially transitioned to BIPAP but actively vomiting, decision made to intubate for airway protection  Will plan for sepsis work up; icu admission; bp improving with NTG gtt

## 2025-07-12 NOTE — ED ADULT NURSE NOTE - NSFALLHARMRISKINTERV_ED_ALL_ED

## 2025-07-12 NOTE — ED PROVIDER NOTE - NSTIMEPROVIDERCAREINITIATE_GEN_ER
Addended by: JUANITO GUZMAN on: 7/26/2023 11:13 AM     Modules accepted: Orders     12-Jul-2025 01:58

## 2025-07-12 NOTE — PROVIDER CONTACT NOTE (EICU) - BACKGROUND
59 year old male with a PMH of Bipolar dz on abilify, HTN, HLD, CAD s/p MI with PCI (~10 years ago), HF with mildly reduced EF, COPD, Active smoker 1 PPD, Polycystic kidney disease, ESRD on HD T/Th/Sa, and BPH who presented to [facility] ED with c/o cough and SOB for a few days found to be hypoxic by EMS with O2 sat in 60's. While in ED pt noted to be hypertensive with SBP noted into mid 200's, was started on nitroglycerin infusion, discontinued due to decrease in BP. Pt was vomiting and febrile with concern for aspiration. He failed NIV trial and was subsequently intubated and admitted to ICU for hypoxic/hypercarbic respiratory failure, pulmonary edema, PNA, aspiration.

## 2025-07-12 NOTE — PATIENT PROFILE ADULT - FALL HARM RISK - HARM RISK INTERVENTIONS

## 2025-07-12 NOTE — CONSULT NOTE ADULT - SUBJECTIVE AND OBJECTIVE BOX
HPI:  Patient is a 59y old  Male with ESRD due to PKD, HD dependent for 3 years, usual outpt schedule TTS who was admitted for management of acute resp failure, worsening dyspnea for 24h, possible aspiration.   BP high on presentation but improved. He is off Nitro gtt.   Intubated. Stable BP  Daughter provided hx at bed side.           PAST MEDICAL & SURGICAL HISTORY:  Diabetes  Hypertension  Hyperlipidemia  CAD (coronary artery disease)  ESRD on dialysis  Polycystic kidney disease  BPH (benign prostatic hyperplasia)  Bipolar disorder  Schizophrenia            Allergies    No Known Allergies        MEDICATIONS  (STANDING):  albuterol/ipratropium for Nebulization 3 milliLiter(s) Nebulizer every 6 hours  azithromycin  IVPB 500 milliGRAM(s) IV Intermittent every 24 hours  chlorhexidine 0.12% Liquid 15 milliLiter(s) Oral Mucosa every 12 hours  chlorhexidine 2% Cloths 1 Application(s) Topical <User Schedule>  fentaNYL   Infusion. 0.5 MICROgram(s)/kG/Hr (2.27 mL/Hr) IV Continuous <Continuous>  heparin   Injectable 5000 Unit(s) SubCutaneous every 8 hours  nicotine - 21 mG/24Hr(s) Patch 1 Patch Transdermal daily  pantoprazole  Injectable 40 milliGRAM(s) IV Push daily  piperacillin/tazobactam IVPB.. 3.375 Gram(s) IV Intermittent every 12 hours  polyethylene glycol 3350 17 Gram(s) Oral daily  propofol Infusion 10 MICROgram(s)/kG/Min (2.72 mL/Hr) IV Continuous <Continuous>  senna 2 Tablet(s) Oral at bedtime    MEDICATIONS  (PRN):      Daily Height in cm: 167.64 (12 Jul 2025 01:51)    Daily     Drug Dosing Weight  Height (cm): 167.6 (12 Jul 2025 01:51)  Weight (kg): 67.9 (12 Jul 2025 05:10)  BMI (kg/m2): 24.2 (12 Jul 2025 05:10)  BSA (m2): 1.77 (12 Jul 2025 05:10)      REVIEW OF SYSTEMS:    [facility]      ABG - ( 12 Jul 2025 05:38 )  pH, Arterial: 7.46  pH, Blood: x     /  pCO2: 39    /  pO2: 126   / HCO3: 28    / Base Excess: 3.6   /  SaO2: 99.5                I&O's Detail    11 Jul 2025 07:01  -  12 Jul 2025 07:00  --------------------------------------------------------  IN:    Nitroglycerin: 22.6 mL    Propofol: 56 mL  Total IN: 78.6 mL    OUT:    Indwelling Catheter - Urethral (mL): 0 mL  Total OUT: 0 mL    Total NET: 78.6 mL      12 Jul 2025 07:01  -  12 Jul 2025 12:28  --------------------------------------------------------  IN:    Enteral Tube Flush: 100 mL    FentaNYL: 13.6 mL    IV PiggyBack: 350 mL    Propofol: 57.1 mL  Total IN: 520.7 mL    OUT:    Indwelling Catheter - Urethral (mL): 0 mL  Total OUT: 0 mL    Total NET: 520.7 mL          07-11 @ 07:01 - 07-12 @ 07:00  --------------------------------------------------------  IN: 78.6 mL / OUT: 0 mL / NET: 78.6 mL    07-12 @ 07:01  - 07-12 @ 12:28  --------------------------------------------------------  IN: 520.7 mL / OUT: 0 mL / NET: 520.7 mL        PHYSICAL EXAM:    GENERAL: intubated  HEAD:  Atraumatic, normocephalic  CHEST/LUNG: Clear to auscultation bilaterally  HEART: Regular rate and rhythm. No murmurs, rubs, or gallops  ABDOMEN: Soft, Nontender, Nondistended. POS BS  EXTREMITIES:  no edema  R AVG t/b      LABS:  CBC Full  -  ( 12 Jul 2025 02:15 )  WBC Count : 10.18 K/uL  RBC Count : 4.89 M/uL  Hemoglobin : 14.1 g/dL  Hematocrit : 44.6 %  Platelet Count - Automated : 216 K/uL  Mean Cell Volume : 91.2 fl  Mean Cell Hemoglobin : 28.8 pg  Mean Cell Hemoglobin Concentration : 31.6 g/dL  Auto Neutrophil # : x  Auto Lymphocyte # : x  Auto Monocyte # : x  Auto Eosinophil # : x  Auto Basophil # : x  Auto Neutrophil % : x  Auto Lymphocyte % : x  Auto Monocyte % : x  Auto Eosinophil % : x  Auto Basophil % : x    07-12    131[L]  |  97  |  23  ----------------------------<  124[H]  5.7[H]   |  25  |  6.22[H]    Ca    8.5      12 Jul 2025 05:00  Phos  4.0     07-12  Mg     2.4     07-12    TPro  8.5[H]  /  Alb  4.0  /  TBili  0.4  /  DBili  x   /  AST  17  /  ALT  20  /  AlkPhos  87  07-12      Impression:  * HD dependent ESRD. Usual outpt schedule TTS  * Resp failure due to pulm edema and possibly aspiration  * Hypertensive urgency. Better.     Recommendations:  Will arrange for HD today with UF goal 3L  K free bath last 30 min

## 2025-07-12 NOTE — ED ADULT TRIAGE NOTE - CHIEF COMPLAINT QUOTE
Per EMS PT PW SOB noted sat 62 % on RA, hx of dialysis T/W/Sat s/p dialysis yesterday fistula to LUE. PT placed on CPAP sat  90%, /120 given dueoneb, dex 10mg IM, 3 nitro PO

## 2025-07-12 NOTE — H&P ADULT - NSICDXPASTMEDICALHX_GEN_ALL_CORE_FT
PAST MEDICAL HISTORY:  BPH (benign prostatic hyperplasia)     CAD (coronary artery disease)     Diabetes     ESRD on dialysis     Hyperlipidemia     Hypertension     Polycystic kidney disease

## 2025-07-12 NOTE — H&P ADULT - HISTORY OF PRESENT ILLNESS
Mr. Moralez is a 59 year old male with a PMH of HTN, HLD, CAD s/ MI, COPD, Polycystic kidney disease, ESRD on HD T/Th/Sa, and BPH who presented to [facility] ED with c/o cough and SOB for a few days found to be hypoxic by EMS with O2 sat in 60's. While in ED pt noted to be hypertensive with SBP noted into mid 200's. Pt was vomiting and febrile with concern for aspiration. He failed NIV trial and was subsequently intubated. ROS otherwise negative. Last HD was reportedly yesterday with full session completed. Pt accepted to ICU for further management / care.  Mr. Moralez is a 59 year old male with a PMH of Bipolar dz on abilify, HTN, HLD, CAD s/p MI with PCI (~10 years ago), HF with mildly reduced EF, COPD, Active smoker 1 PPD, Polycystic kidney disease, ESRD on HD T/Th/Sa, and BPH who presented to [facility] ED with c/o cough and SOB for a few days found to be hypoxic by EMS with O2 sat in 60's. While in ED pt noted to be hypertensive with SBP noted into mid 200's. Pt was vomiting and febrile with concern for aspiration. He failed NIV trial and was subsequently intubated. ROS otherwise negative. Last HD was reportedly yesterday with full session completed. Pt accepted to ICU for further management / care.

## 2025-07-12 NOTE — PROVIDER CONTACT NOTE (EICU) - ASSESSMENT
Patient seen using two-way, real-time, HIPAA-complaint AV technology HR 56 /80 RR 18 O2 sat 100% on MV  RR 18 FiO2 40 PEEP 6, fentanyl and propofol infusions

## 2025-07-12 NOTE — H&P ADULT - NSHPPHYSICALEXAM_GEN_ALL_CORE
T(C): 39.1 (07-12-25 @ 01:51), Max: 39.1 (07-12-25 @ 01:51)  HR: 80 (07-12-25 @ 03:16) (74 - 130)  BP: 135/88 (07-12-25 @ 03:16) (99/73 - 247/148)  RR: 21 (07-12-25 @ 03:16) (18 - 35)  SpO2: 100% (07-12-25 @ 03:16) (88% - 100%)    CONSTITUTIONAL: Middle aged male, intubated, NAD  EYES: PERRL and symmetric, EOMI, No conjunctival or scleral injection, non-icteric  ENMT: Oral mucosa with moist membranes. Normal dentition; no pharyngeal injection or exudates  NECK: Supple, symmetric and without tracheal deviation   RESP: Coarse bs noted b/l, equal chest expansion  CV: RRR, +S1S2 noted  GI: Soft, mildly distended, no rebound, no guarding  LYMPH: No cervical LAD or tenderness; no axillary LAD or tenderness; no inguinal LAD or tenderness  MSK: HUNTER x4, normal ROM  SKIN: No rashes or ulcers noted  NEURO: nonfocal exam sedated  PSYCH: JAVID 2/2 intubated / sedated

## 2025-07-13 LAB
ALBUMIN SERPL ELPH-MCNC: 2.5 G/DL — LOW (ref 3.3–5)
ALP SERPL-CCNC: 52 U/L — SIGNIFICANT CHANGE UP (ref 40–120)
ALT FLD-CCNC: 15 U/L — SIGNIFICANT CHANGE UP (ref 12–78)
ANION GAP SERPL CALC-SCNC: 7 MMOL/L — SIGNIFICANT CHANGE UP (ref 5–17)
AST SERPL-CCNC: 15 U/L — SIGNIFICANT CHANGE UP (ref 15–37)
BASE EXCESS BLDA CALC-SCNC: 9.5 MMOL/L — HIGH (ref -2–3)
BASOPHILS # BLD AUTO: 0.02 K/UL — SIGNIFICANT CHANGE UP (ref 0–0.2)
BASOPHILS NFR BLD AUTO: 0.3 % — SIGNIFICANT CHANGE UP (ref 0–2)
BILIRUB SERPL-MCNC: 0.4 MG/DL — SIGNIFICANT CHANGE UP (ref 0.2–1.2)
BLOOD GAS COMMENTS ARTERIAL: SIGNIFICANT CHANGE UP
BUN SERPL-MCNC: 17 MG/DL — SIGNIFICANT CHANGE UP (ref 7–23)
CALCIUM SERPL-MCNC: 8.3 MG/DL — LOW (ref 8.5–10.1)
CHLORIDE SERPL-SCNC: 100 MMOL/L — SIGNIFICANT CHANGE UP (ref 96–108)
CHOLEST SERPL-MCNC: 152 MG/DL — SIGNIFICANT CHANGE UP
CO2 BLDA-SCNC: 33 MMOL/L — HIGH (ref 19–24)
CO2 SERPL-SCNC: 30 MMOL/L — SIGNIFICANT CHANGE UP (ref 22–31)
CREAT SERPL-MCNC: 4.41 MG/DL — HIGH (ref 0.5–1.3)
CULTURE RESULTS: ABNORMAL
EGFR: 14 ML/MIN/1.73M2 — LOW
EGFR: 14 ML/MIN/1.73M2 — LOW
EOSINOPHIL # BLD AUTO: 0.01 K/UL — SIGNIFICANT CHANGE UP (ref 0–0.5)
EOSINOPHIL NFR BLD AUTO: 0.2 % — SIGNIFICANT CHANGE UP (ref 0–6)
GAS PNL BLDA: SIGNIFICANT CHANGE UP
GLUCOSE SERPL-MCNC: 155 MG/DL — HIGH (ref 70–99)
GRAM STN FLD: ABNORMAL
HBV SURFACE AB SER-ACNC: 625.5 MIU/ML — SIGNIFICANT CHANGE UP
HBV SURFACE AG SER-ACNC: SIGNIFICANT CHANGE UP
HCO3 BLDA-SCNC: 32 MMOL/L — HIGH (ref 21–28)
HCT VFR BLD CALC: 27.8 % — LOW (ref 39–50)
HDLC SERPL-MCNC: 34 MG/DL — LOW
HGB BLD-MCNC: 9.3 G/DL — LOW (ref 13–17)
HOROWITZ INDEX BLDA+IHG-RTO: 25 — SIGNIFICANT CHANGE UP
IMM GRANULOCYTES NFR BLD AUTO: 0.2 % — SIGNIFICANT CHANGE UP (ref 0–0.9)
LACTATE SERPL-SCNC: 1 MMOL/L — SIGNIFICANT CHANGE UP (ref 0.7–2)
LDLC SERPL-MCNC: 79 MG/DL — SIGNIFICANT CHANGE UP
LEGIONELLA AG UR QL: NEGATIVE — SIGNIFICANT CHANGE UP
LIPID PNL WITH DIRECT LDL SERPL: 79 MG/DL — SIGNIFICANT CHANGE UP
LYMPHOCYTES # BLD AUTO: 1.06 K/UL — SIGNIFICANT CHANGE UP (ref 1–3.3)
LYMPHOCYTES # BLD AUTO: 17.5 % — SIGNIFICANT CHANGE UP (ref 13–44)
MAGNESIUM SERPL-MCNC: 2.4 MG/DL — SIGNIFICANT CHANGE UP (ref 1.6–2.6)
MCHC RBC-ENTMCNC: 29.7 PG — SIGNIFICANT CHANGE UP (ref 27–34)
MCHC RBC-ENTMCNC: 33.8 G/DL — SIGNIFICANT CHANGE UP (ref 32–36)
MCV RBC AUTO: 87.7 FL — SIGNIFICANT CHANGE UP (ref 80–100)
MONOCYTES # BLD AUTO: 0.34 K/UL — SIGNIFICANT CHANGE UP (ref 0–0.9)
MONOCYTES NFR BLD AUTO: 5.6 % — SIGNIFICANT CHANGE UP (ref 2–14)
NEUTROPHILS # BLD AUTO: 4.61 K/UL — SIGNIFICANT CHANGE UP (ref 1.8–7.4)
NEUTROPHILS NFR BLD AUTO: 76.2 % — SIGNIFICANT CHANGE UP (ref 43–77)
NONHDLC SERPL-MCNC: 118 MG/DL — SIGNIFICANT CHANGE UP
NRBC BLD AUTO-RTO: 0 /100 WBCS — SIGNIFICANT CHANGE UP (ref 0–0)
NT-PROBNP SERPL-SCNC: HIGH PG/ML (ref 0–125)
PCO2 BLDA: 36 MMHG — SIGNIFICANT CHANGE UP (ref 32–46)
PH BLDA: 7.56 — HIGH (ref 7.35–7.45)
PHOSPHATE SERPL-MCNC: 2.6 MG/DL — SIGNIFICANT CHANGE UP (ref 2.5–4.5)
PLATELET # BLD AUTO: 147 K/UL — LOW (ref 150–400)
PO2 BLDA: 125 MMHG — HIGH (ref 83–108)
POTASSIUM SERPL-MCNC: 4 MMOL/L — SIGNIFICANT CHANGE UP (ref 3.5–5.3)
POTASSIUM SERPL-SCNC: 4 MMOL/L — SIGNIFICANT CHANGE UP (ref 3.5–5.3)
PROCALCITONIN SERPL-MCNC: 0.9 NG/ML — HIGH (ref 0.02–0.1)
PROT SERPL-MCNC: 5.4 GM/DL — LOW (ref 6–8.3)
RBC # BLD: 3.17 M/UL — LOW (ref 4.2–5.8)
RBC # FLD: 14.6 % — HIGH (ref 10.3–14.5)
S PNEUM AG UR QL: NEGATIVE — SIGNIFICANT CHANGE UP
SAO2 % BLDA: 99 % — HIGH (ref 94–98)
SODIUM SERPL-SCNC: 137 MMOL/L — SIGNIFICANT CHANGE UP (ref 135–145)
SPECIMEN SOURCE: SIGNIFICANT CHANGE UP
TRIGL SERPL-MCNC: 235 MG/DL — HIGH
WBC # BLD: 6.05 K/UL — SIGNIFICANT CHANGE UP (ref 3.8–10.5)
WBC # FLD AUTO: 6.05 K/UL — SIGNIFICANT CHANGE UP (ref 3.8–10.5)

## 2025-07-13 PROCEDURE — 99291 CRITICAL CARE FIRST HOUR: CPT

## 2025-07-13 RX ORDER — ARIPIPRAZOLE 2 MG/1
30 TABLET ORAL DAILY
Refills: 0 | Status: DISCONTINUED | OUTPATIENT
Start: 2025-07-13 | End: 2025-07-16

## 2025-07-13 RX ADMIN — NICOTINE POLACRILEX 1 PATCH: 4 GUM, CHEWING ORAL at 12:02

## 2025-07-13 RX ADMIN — Medication 25 GRAM(S): at 09:59

## 2025-07-13 RX ADMIN — HEPARIN SODIUM 5000 UNIT(S): 1000 INJECTION INTRAVENOUS; SUBCUTANEOUS at 05:02

## 2025-07-13 RX ADMIN — IPRATROPIUM BROMIDE AND ALBUTEROL SULFATE 3 MILLILITER(S): .5; 2.5 SOLUTION RESPIRATORY (INHALATION) at 11:25

## 2025-07-13 RX ADMIN — IPRATROPIUM BROMIDE AND ALBUTEROL SULFATE 3 MILLILITER(S): .5; 2.5 SOLUTION RESPIRATORY (INHALATION) at 17:37

## 2025-07-13 RX ADMIN — HEPARIN SODIUM 5000 UNIT(S): 1000 INJECTION INTRAVENOUS; SUBCUTANEOUS at 21:32

## 2025-07-13 RX ADMIN — HEPARIN SODIUM 5000 UNIT(S): 1000 INJECTION INTRAVENOUS; SUBCUTANEOUS at 13:33

## 2025-07-13 RX ADMIN — NICOTINE POLACRILEX 1 PATCH: 4 GUM, CHEWING ORAL at 11:40

## 2025-07-13 RX ADMIN — PROPOFOL 2.72 MICROGRAM(S)/KG/MIN: 10 INJECTION, EMULSION INTRAVENOUS at 04:38

## 2025-07-13 RX ADMIN — Medication 100 MILLIGRAM(S): at 21:32

## 2025-07-13 RX ADMIN — Medication 25 GRAM(S): at 21:33

## 2025-07-13 RX ADMIN — Medication 1 LOZENGE: at 17:54

## 2025-07-13 RX ADMIN — IPRATROPIUM BROMIDE AND ALBUTEROL SULFATE 3 MILLILITER(S): .5; 2.5 SOLUTION RESPIRATORY (INHALATION) at 23:38

## 2025-07-13 RX ADMIN — NICOTINE POLACRILEX 1 PATCH: 4 GUM, CHEWING ORAL at 18:31

## 2025-07-13 RX ADMIN — NICOTINE POLACRILEX 1 PATCH: 4 GUM, CHEWING ORAL at 08:39

## 2025-07-13 RX ADMIN — Medication 1 APPLICATION(S): at 05:00

## 2025-07-13 RX ADMIN — Medication 255 MILLIGRAM(S): at 08:43

## 2025-07-13 RX ADMIN — Medication 1 LOZENGE: at 16:01

## 2025-07-13 RX ADMIN — IPRATROPIUM BROMIDE AND ALBUTEROL SULFATE 3 MILLILITER(S): .5; 2.5 SOLUTION RESPIRATORY (INHALATION) at 05:31

## 2025-07-13 RX ADMIN — Medication 1 DOSE(S): at 17:42

## 2025-07-13 RX ADMIN — Medication 15 MILLILITER(S): at 05:00

## 2025-07-13 RX ADMIN — POLYETHYLENE GLYCOL 3350 17 GRAM(S): 17 POWDER, FOR SOLUTION ORAL at 11:40

## 2025-07-13 RX ADMIN — ARIPIPRAZOLE 30 MILLIGRAM(S): 2 TABLET ORAL at 17:42

## 2025-07-13 RX ADMIN — Medication 100 MILLIGRAM(S): at 12:19

## 2025-07-13 RX ADMIN — Medication 2 TABLET(S): at 21:33

## 2025-07-13 NOTE — CHART NOTE - NSCHARTNOTEFT_GEN_A_CORE
MICU DOWN GRADE NOTE  Accepting MD: dr gomes     Patient is a 59y old  Male who presents with a chief complaint of Intubated, Sepsis, PNA, HF (13 Jul 2025 11:34)    HPI:  59M w/ bipolar disease, HTN, CAD, HFmrEF, COPD - active smoker 1 PPM, ESRD on HD due to PKD. Presents w/ SOB and cough. In ED, pt hypoxemic and hypertensive and febrile. Placed on BiPAP but had episode of vomiting and possible aspiration requiring for intubation. Imaging reveals bibasilar consolidations. PT admitted to icu and started on zosyn for empiric aspiration coverage. Pt S/P HD yesterday with ~2.5L removed. Extubated this morning to NC and now sitting in a chair.      INTERVAL HPI/OVERNIGHT EVENTS: PT extubated to RA this AM texting in a chair. PT stable for downgrade to medicine         MEDICATIONS:  albuterol/ipratropium for Nebulization 3 milliLiter(s) Nebulizer every 6 hours  ARIPiprazole 30 milliGRAM(s) Oral daily  azithromycin  IVPB 500 milliGRAM(s) IV Intermittent every 24 hours  benzocaine/menthol Lozenge 1 Lozenge Oral four times a day PRN  chlorhexidine 2% Cloths 1 Application(s) Topical <User Schedule>  fluticasone propionate/ salmeterol 100-50 MICROgram(s) Diskus 1 Dose(s) Inhalation two times a day  heparin   Injectable 5000 Unit(s) SubCutaneous every 8 hours  hydrALAZINE 100 milliGRAM(s) Oral every 8 hours  nicotine - 21 mG/24Hr(s) Patch 1 Patch Transdermal daily  piperacillin/tazobactam IVPB.. 3.375 Gram(s) IV Intermittent every 12 hours  polyethylene glycol 3350 17 Gram(s) Oral daily  senna 2 Tablet(s) Oral at bedtime      T(C): 37.3 (07-13-25 @ 11:00), Max: 37.4 (07-13-25 @ 00:12)  HR: 72 (07-13-25 @ 12:00) (53 - 93)  BP: 176/85 (07-13-25 @ 12:00) (103/66 - 176/85)  RR: 22 (07-13-25 @ 12:00) (14 - 23)  SpO2: 100% (07-13-25 @ 12:00) (97% - 100%)  Wt(kg): --Vital Signs Last 24 Hrs  T(C): 37.3 (13 Jul 2025 11:00), Max: 37.4 (13 Jul 2025 00:12)  T(F): 99.1 (13 Jul 2025 11:00), Max: 99.3 (13 Jul 2025 00:12)  HR: 72 (13 Jul 2025 12:00) (53 - 93)  BP: 176/85 (13 Jul 2025 12:00) (103/66 - 176/85)  BP(mean): 111 (13 Jul 2025 12:00) (13 - 113)  RR: 22 (13 Jul 2025 12:00) (14 - 23)  SpO2: 100% (13 Jul 2025 12:00) (97% - 100%)    Parameters below as of 13 Jul 2025 12:00  Patient On (Oxygen Delivery Method): room air          Consultant(s) Notes Reviewed:  [x ] YES  [ ] NO  Care Discussed with Consultants/Other Providers [ x] YES  [ ] NO    LABS:                        9.3    6.05  )-----------( 147      ( 13 Jul 2025 04:40 )             27.8     07-13    137  |  100  |  17  ----------------------------<  155[H]  4.0   |  30  |  4.41[H]    Ca    8.3[L]      13 Jul 2025 04:40  Phos  2.6     07-13  Mg     2.4     07-13    TPro  5.4[L]  /  Alb  2.5[L]  /  TBili  0.4  /  DBili  x   /  AST  15  /  ALT  15  /  AlkPhos  52  07-13    PT/INR - ( 12 Jul 2025 02:15 )   PT: 10.9 sec;   INR: 0.94 ratio         PTT - ( 12 Jul 2025 02:15 )  PTT:32.9 sec  Urinalysis Basic - ( 13 Jul 2025 04:40 )    Color: x / Appearance: x / SG: x / pH: x  Gluc: 155 mg/dL / Ketone: x  / Bili: x / Urobili: x   Blood: x / Protein: x / Nitrite: x   Leuk Esterase: x / RBC: x / WBC x   Sq Epi: x / Non Sq Epi: x / Bacteria: x      CAPILLARY BLOOD GLUCOSE          ABG - ( 13 Jul 2025 04:00 )  pH, Arterial: 7.56  pH, Blood: x     /  pCO2: 36    /  pO2: 125   / HCO3: 32    / Base Excess: 9.5   /  SaO2: 99.0              Urinalysis Basic - ( 13 Jul 2025 04:40 )    Color: x / Appearance: x / SG: x / pH: x  Gluc: 155 mg/dL / Ketone: x  / Bili: x / Urobili: x   Blood: x / Protein: x / Nitrite: x   Leuk Esterase: x / RBC: x / WBC x   Sq Epi: x / Non Sq Epi: x / Bacteria: x        RADIOLOGY & ADDITIONAL TESTS:    Imaging Personally Reviewed:  [x ] YES  [ ] NO    59M w/ bipolar disease, HTN, CAD, HFmrEF, COPD - active smoker 1 PPM, ESRD on HD due to PKD. Presents w/ SOB and cough. In ED, pt hypoxemic and hypertensive and febrile. Placed on BiPAP but had episode of vomiting and possible aspiration requiring for intubation. Imaging reveals bibasilar consolidations. Pt admitted to icu for further mngmt.    To follow up:  - intubated 7/12 for HRF 2/2 flash pulm edema. ESRD on HD. S/p 2.5L removed with HD yest and extubated this AM to RA. Cont dialysis per renal  - cont zosyn and zithro for 5 day course for aspiration pna. prelim cx neg, f/u final cx results  - cont to uptitrate antiHTNs for BP  - cont duonebs for now for copd and active smoking with benefit of airway clearance.     d/w dr astorga and dr shaw

## 2025-07-13 NOTE — AIRWAY REMOVAL NOTE  ADULT & PEDS - ARTIFICAL AIRWAY REMOVAL COMMENTS
Patient extubated to 2L NC. No stridor noted post extubation. No use of accessory muscle and chest expansion symmetrical. Unlabored breathing with regular pattern, depth and rate. Breath sounds were rhonchi on ausculation. Patient has a good cough with no productive sputum. Patient remains on 2L NC. Will continue to monitor patient status.

## 2025-07-13 NOTE — PROGRESS NOTE ADULT - SUBJECTIVE AND OBJECTIVE BOX
Subjective: extubated since seen yest. Comfortable in chair.   Uneventful HD yest.       MEDICATIONS  (STANDING):  albuterol/ipratropium for Nebulization 3 milliLiter(s) Nebulizer every 6 hours  azithromycin  IVPB 500 milliGRAM(s) IV Intermittent every 24 hours  chlorhexidine 2% Cloths 1 Application(s) Topical <User Schedule>  fluticasone propionate/ salmeterol 100-50 MICROgram(s) Diskus 1 Dose(s) Inhalation two times a day  heparin   Injectable 5000 Unit(s) SubCutaneous every 8 hours  nicotine - 21 mG/24Hr(s) Patch 1 Patch Transdermal daily  piperacillin/tazobactam IVPB.. 3.375 Gram(s) IV Intermittent every 12 hours  polyethylene glycol 3350 17 Gram(s) Oral daily  senna 2 Tablet(s) Oral at bedtime    MEDICATIONS  (PRN):          T(C): 37.3 (07-13-25 @ 11:00), Max: 37.4 (07-13-25 @ 00:12)  HR: 88 (07-13-25 @ 11:00) (53 - 93)  BP: 169/90 (07-13-25 @ 11:00) (103/66 - 174/80)  RR: 21 (07-13-25 @ 11:00) (14 - 23)  SpO2: 97% (07-13-25 @ 11:00) (97% - 100%)  Wt(kg): --    ABG - ( 13 Jul 2025 04:00 )  pH, Arterial: 7.56  pH, Blood: x     /  pCO2: 36    /  pO2: 125   / HCO3: 32    / Base Excess: 9.5   /  SaO2: 99.0                I&O's Detail    12 Jul 2025 07:01  -  13 Jul 2025 07:00  --------------------------------------------------------  IN:    Enteral Tube Flush: 100 mL    Enteral Tube Flush: 200 mL    FentaNYL: 13.6 mL    IV PiggyBack: 450 mL    Nepro with Carb Steady: 560 mL    Propofol: 375.1 mL  Total IN: 1698.7 mL    OUT:    Indwelling Catheter - Urethral (mL): 0 mL    Other (mL): 2500 mL    Voided (mL): 0 mL  Total OUT: 2500 mL    Total NET: -801.3 mL      13 Jul 2025 07:01  -  13 Jul 2025 11:35  --------------------------------------------------------  IN:    IV PiggyBack: 350 mL    Oral Fluid: 280 mL  Total IN: 630 mL    OUT:  Total OUT: 0 mL    Total NET: 630 mL          Mode: standby       PHYSICAL EXAM:    GENERAL: NAD  NECK: Supple, no inc in JVP  CHEST/LUNG: Clear  HEART: S1S2  ABDOMEN: Soft, Nontender, Nondistended; Bowel sounds present  EXTREMITIES:  no edema.   NEURO: no asterixis  R AVG t/b    LABS:  CBC Full  -  ( 13 Jul 2025 04:40 )  WBC Count : 6.05 K/uL  RBC Count : 3.17 M/uL  Hemoglobin : 9.3 g/dL  Hematocrit : 27.8 %  Platelet Count - Automated : 147 K/uL  Mean Cell Volume : 87.7 fl  Mean Cell Hemoglobin : 29.7 pg  Mean Cell Hemoglobin Concentration : 33.8 g/dL  Auto Neutrophil # : x  Auto Lymphocyte # : x  Auto Monocyte # : x  Auto Eosinophil # : x  Auto Basophil # : x  Auto Neutrophil % : x  Auto Lymphocyte % : x  Auto Monocyte % : x  Auto Eosinophil % : x  Auto Basophil % : x    07-13    137  |  100  |  17  ----------------------------<  155[H]  4.0   |  30  |  4.41[H]    Ca    8.3[L]      13 Jul 2025 04:40  Phos  2.6     07-13  Mg     2.4     07-13    TPro  5.4[L]  /  Alb  2.5[L]  /  TBili  0.4  /  DBili  x   /  AST  15  /  ALT  15  /  AlkPhos  52  07-13    PT/INR - ( 12 Jul 2025 02:15 )   PT: 10.9 sec;   INR: 0.94 ratio           Impression:  * HD dependent ESRD. Usual outpt schedule TTS  * Resp failure due to pulm edema and possibly aspiration. Extubated.   * Hypertensive urgency. Better.     Recommendations:  No dialytic indications today.   Next HD will be on Tues unless urgent indications arise earlier

## 2025-07-13 NOTE — PROGRESS NOTE ADULT - SUBJECTIVE AND OBJECTIVE BOX
CHIEF COMPLAINT:    Interval Events:    REVIEW OF SYSTEMS:  Constitutional: [ ] fevers [ ] chills [ ] weight loss [ ] weight gain  HEENT: [ ] dry eyes [ ] eye irritation [ ] postnasal drip [ ] nasal congestion  CV: [ ] chest pain [ ] orthopnea [ ] palpitations [ ] murmur  Resp: [ ] cough [ ] shortness of breath [ ] dyspnea [ ] wheezing [ ] sputum [ ] hemoptysis  GI: [ ] nausea [ ] vomiting [ ] diarrhea [ ] constipation [ ] abd pain [ ] dysphagia   : [ ] dysuria [ ] nocturia [ ] hematuria [ ] increased urinary frequency  Musculoskeletal: [ ] back pain [ ] myalgias [ ] arthralgias [ ] fracture  Skin: [ ] rash [ ] itch  Neurological: [ ] headache [ ] dizziness [ ] syncope [ ] weakness [ ] numbness  Hematologic/Lymphatic: [ ] anemia [ ] bleeding problem  Allergic/Immunologic: [ ] itchy eyes [ ] nasal discharge [ ] hives [ ] angioedema  [ ] All other systems negative  [ ] Unable to assess ROS because ________    OBJECTIVE:  ICU Vital Signs Last 24 Hrs  T(C): 36.7 (13 Jul 2025 07:03), Max: 37.4 (13 Jul 2025 00:12)  T(F): 98.1 (13 Jul 2025 07:03), Max: 99.3 (13 Jul 2025 00:12)  HR: 73 (13 Jul 2025 07:03) (53 - 82)  BP: 154/74 (13 Jul 2025 07:03) (103/66 - 187/92)  BP(mean): 99 (13 Jul 2025 07:03) (13 - 121)  ABP: --  ABP(mean): --  RR: 14 (13 Jul 2025 07:03) (11 - 23)  SpO2: 100% (13 Jul 2025 07:03) (97% - 100%)    O2 Parameters below as of 13 Jul 2025 05:59  Patient On (Oxygen Delivery Method): ventilator          Mode: CPAP with PS, FiO2: 25, PEEP: 6, PS: 8, ITime: 1, MAP: 9, PIP: 14    07-12 @ 07:01  -  07-13 @ 07:00  --------------------------------------------------------  IN: 1698.7 mL / OUT: 2500 mL / NET: -801.3 mL      CAPILLARY BLOOD GLUCOSE          PHYSICAL EXAM:  General:   HEENT:   Neck:   Respiratory:   Cardiovascular:   Abdomen:   Extremities:   Skin:   Neurological:  Psychiatry:    LINES:    HOSPITAL MEDICATIONS:  MEDICATIONS  (STANDING):  albuterol/ipratropium for Nebulization 3 milliLiter(s) Nebulizer every 6 hours  azithromycin  IVPB 500 milliGRAM(s) IV Intermittent every 24 hours  chlorhexidine 2% Cloths 1 Application(s) Topical <User Schedule>  heparin   Injectable 5000 Unit(s) SubCutaneous every 8 hours  nicotine - 21 mG/24Hr(s) Patch 1 Patch Transdermal daily  pantoprazole  Injectable 40 milliGRAM(s) IV Push daily  piperacillin/tazobactam IVPB.. 3.375 Gram(s) IV Intermittent every 12 hours  polyethylene glycol 3350 17 Gram(s) Oral daily  senna 2 Tablet(s) Oral at bedtime    MEDICATIONS  (PRN):  fentaNYL    Injectable 50 MICROGram(s) IV Push every 2 hours PRN Sedatin / Vent complaince      LABS:                        9.3    6.05  )-----------( 147      ( 13 Jul 2025 04:40 )             27.8     Hgb Trend: 9.3<--, 14.1<--  07-13    137  |  100  |  17  ----------------------------<  155[H]  4.0   |  30  |  4.41[H]    Ca    8.3[L]      13 Jul 2025 04:40  Phos  2.6     07-13  Mg     2.4     07-13    TPro  5.4[L]  /  Alb  2.5[L]  /  TBili  0.4  /  DBili  x   /  AST  15  /  ALT  15  /  AlkPhos  52  07-13    PT/INR - ( 12 Jul 2025 02:15 )   PT: 10.9 sec;   INR: 0.94 ratio         PTT - ( 12 Jul 2025 02:15 )  PTT:32.9 sec  Urinalysis Basic - ( 13 Jul 2025 04:40 )    Color: x / Appearance: x / SG: x / pH: x  Gluc: 155 mg/dL / Ketone: x  / Bili: x / Urobili: x   Blood: x / Protein: x / Nitrite: x   Leuk Esterase: x / RBC: x / WBC x   Sq Epi: x / Non Sq Epi: x / Bacteria: x      Arterial Blood Gas:  07-13 @ 04:00  7.56/36/125/32/99.0/9.5  ABG lactate: --  Arterial Blood Gas:  07-12 @ 05:38  7.46/39/126/28/99.5/3.6  ABG lactate: --  Arterial Blood Gas:  07-12 @ 02:45  7.26/61/69/35/93.0/-0.7  ABG lactate: --        MICROBIOLOGY:     RADIOLOGY:  [ ] Reviewed and interpreted by me CHIEF COMPLAINT:    Interval Events:  s/p HD yesterday 2.5L removed  extubated this morning prior to rounds   no other o/n events    REVIEW OF SYSTEMS:  Constitutional: [- ] fevers [ -] chills [ ] weight loss [ ] weight gain  HEENT: [ ] dry eyes [ ] eye irritation [ ] postnasal drip [ ] nasal congestion  CV: [- ] chest pain [- ] orthopnea [ ] palpitations [ ] murmur  Resp: [+ ] cough [ -] shortness of breath [- ] dyspnea [ -] wheezing [ +] sputum [ ] hemoptysis  GI: [- ] nausea [- ] vomiting [- ] diarrhea [ -] constipation [ -] abd pain [ ] dysphagia   : [ ] dysuria [ ] nocturia [ ] hematuria [ ] increased urinary frequency  Musculoskeletal: [ -] back pain [- ] myalgias [ -] arthralgias [ ] fracture  Skin: [ ] rash [ ] itch  Neurological: [- ] headache [- ] dizziness [- ] syncope [- ] weakness [ ] numbness  Hematologic/Lymphatic: [ ] anemia [ ] bleeding problem  Allergic/Immunologic: [ ] itchy eyes [ ] nasal discharge [ ] hives [ ] angioedema  [x ] All other systems negative    OBJECTIVE:  ICU Vital Signs Last 24 Hrs  T(C): 36.7 (13 Jul 2025 07:03), Max: 37.4 (13 Jul 2025 00:12)  T(F): 98.1 (13 Jul 2025 07:03), Max: 99.3 (13 Jul 2025 00:12)  HR: 73 (13 Jul 2025 07:03) (53 - 82)  BP: 154/74 (13 Jul 2025 07:03) (103/66 - 187/92)  BP(mean): 99 (13 Jul 2025 07:03) (13 - 121)  ABP: --  ABP(mean): --  RR: 14 (13 Jul 2025 07:03) (11 - 23)  SpO2: 100% (13 Jul 2025 07:03) (97% - 100%)    O2 Parameters below as of 13 Jul 2025 05:59  Patient On (Oxygen Delivery Method): ventilator          Mode: CPAP with PS, FiO2: 25, PEEP: 6, PS: 8, ITime: 1, MAP: 9, PIP: 14    07-12 @ 07:01  -  07-13 @ 07:00  --------------------------------------------------------  IN: 1698.7 mL / OUT: 2500 mL / NET: -801.3 mL      CAPILLARY BLOOD GLUCOSE          PHYSICAL EXAM:  General: NAD, non toxic appearing  HEENT: dry MM, EOMI  Neck: supple  Respiratory: coarse BS  Cardiovascular: s1s2 RRR  Abdomen: soft, non tender, non distended  Extremities: warm, no edema or clubbing  Skin: intact  Neurological: no focal deficits  Psychiatry: Danielle ferreira    LINES:  [facility] MEDICATIONS:  MEDICATIONS  (STANDING):  albuterol/ipratropium for Nebulization 3 milliLiter(s) Nebulizer every 6 hours  azithromycin  IVPB 500 milliGRAM(s) IV Intermittent every 24 hours  chlorhexidine 2% Cloths 1 Application(s) Topical <User Schedule>  heparin   Injectable 5000 Unit(s) SubCutaneous every 8 hours  nicotine - 21 mG/24Hr(s) Patch 1 Patch Transdermal daily  pantoprazole  Injectable 40 milliGRAM(s) IV Push daily  piperacillin/tazobactam IVPB.. 3.375 Gram(s) IV Intermittent every 12 hours  polyethylene glycol 3350 17 Gram(s) Oral daily  senna 2 Tablet(s) Oral at bedtime    MEDICATIONS  (PRN):  fentaNYL    Injectable 50 MICROGram(s) IV Push every 2 hours PRN Sedatin / Vent complaince      LABS:                        9.3    6.05  )-----------( 147      ( 13 Jul 2025 04:40 )             27.8     Hgb Trend: 9.3<--, 14.1<--  07-13    137  |  100  |  17  ----------------------------<  155[H]  4.0   |  30  |  4.41[H]    Ca    8.3[L]      13 Jul 2025 04:40  Phos  2.6     07-13  Mg     2.4     07-13    TPro  5.4[L]  /  Alb  2.5[L]  /  TBili  0.4  /  DBili  x   /  AST  15  /  ALT  15  /  AlkPhos  52  07-13    PT/INR - ( 12 Jul 2025 02:15 )   PT: 10.9 sec;   INR: 0.94 ratio         PTT - ( 12 Jul 2025 02:15 )  PTT:32.9 sec  Urinalysis Basic - ( 13 Jul 2025 04:40 )    Color: x / Appearance: x / SG: x / pH: x  Gluc: 155 mg/dL / Ketone: x  / Bili: x / Urobili: x   Blood: x / Protein: x / Nitrite: x   Leuk Esterase: x / RBC: x / WBC x   Sq Epi: x / Non Sq Epi: x / Bacteria: x      Arterial Blood Gas:  07-13 @ 04:00  7.56/36/125/32/99.0/9.5  ABG lactate: --  Arterial Blood Gas:  07-12 @ 05:38  7.46/39/126/28/99.5/3.6  ABG lactate: --  Arterial Blood Gas:  07-12 @ 02:45  7.26/61/69/35/93.0/-0.7  ABG lactate: --      < from: TTE Echo Complete w/o Contrast w/ Doppler (07.12.25 @ 10:41) >  CONCLUSIONS:     1. Technically difficult image quality.   2. Left ventricular cavity is normal in size. Left ventricular systolic   function is low-normal with an ejectionfraction of 50 % by Diez's   method of disks.   3. There is mild (grade 1) left ventricular diastolic dysfunction.   4. Aortic root at the sinuses of Valsalva is normal in size, measuring   3.20 cm (indexed 1.81 cm/m²).   5. Mild mitral regurgitation.   6. Pulmonic valve was not well visualized.   7. Mild tricuspid regurgitation.   8. Trileaflet aortic valve with normal systolic excursion. No aortic   valve stenosis.   9. No evidence of aortic regurgitation.  10. Left pleural effusion noted.  11. Small pericardial effusion with no echocardiographic evidence of   tamponade physiology.    < end of copied text >      MICROBIOLOGY:     RADIOLOGY:  [ ] Reviewed and interpreted by me

## 2025-07-13 NOTE — PROGRESS NOTE ADULT - ASSESSMENT
59M w/ bipolar disease, HTN, CAD, HFmrEF, COPD - active smoker 1 PPM, ESRD on HD due to PKD. Presents w/ SOB and cough. In ED, pt hypoxemic and hypertensive and febrile. Placed on BiPAP but had episode of vomiting and possible aspiration requiring for intubation. Imaging reveals bibasilar consolidations. S/P HD yesterday. Extubated this morning.    #Neuro - awake and alert; nicotine patch  #CV - may need to start PO BP meds if BP remains elevated; TTE showing EF ~50%, grade 1 diastolic dysfunction   #Pulm - extubated this morning, now on RA, doing well; continue duonebs for airway clearance, start advair for likely COPD; maintain SpO2 >88%  #ID- currently on zosyn and azithromycin; sputum cx NGTD, remainder of infectious workup in pending  #Renal/metabolic - ESRD on HD, s/p HD yesterday w/ fluid removal  #GI- bedside dysphagia screen, can d/c PPI; continue bowel regimen  #Heme - noted drop in hgb, but no active bleeding, monitor for now  #Endo - monitor BG  #PPx - HSQ q8  #Dispo- remains in ICU post-extubation, but if stable throughout the day, we will transfer to medical floor w/ pulmonary follow-up; prognosis guarded; full code    Plan discussed w/ pt's daughter, son and sister at bedside, all questions answered. 59M w/ bipolar disease, HTN, CAD, HFmrEF, COPD - active smoker 1 PPM, ESRD on HD due to PKD. Presents w/ SOB and cough. In ED, pt hypoxemic and hypertensive and febrile. Placed on BiPAP but had episode of vomiting and possible aspiration requiring for intubation. Imaging reveals bibasilar consolidations. S/P HD yesterday. Extubated this morning.    #Neuro - awake and alert; nicotine patch  #CV - may need to start PO BP meds if BP remains elevated; TTE showing EF ~50%, grade 1 diastolic dysfunction   #Pulm - extubated this morning, now on RA, doing well; continue duonebs for airway clearance, start advair for likely COPD; maintain SpO2 >88%  #ID- currently on zosyn and azithromycin; sputum cx NGTD, remainder of infectious workup in pending  #Renal/metabolic - ESRD on HD, s/p HD yesterday w/ fluid removal  #GI- bedside dysphagia screen, can d/c PPI; continue bowel regimen  #Heme - noted drop in hgb, but no active bleeding, monitor for now  #Endo - monitor BG  #PPx - HSQ q8  #Dispo- remains in ICU post-extubation, but if stable throughout the day, we will transfer to medical floor w/ pulmonary follow-up; prognosis guarded; full code - OOB to chair    Plan discussed w/ pt's daughter, son and sister at bedside, all questions answered.

## 2025-07-14 LAB
A1C WITH ESTIMATED AVERAGE GLUCOSE RESULT: 4.7 % — SIGNIFICANT CHANGE UP (ref 4–5.6)
A1C WITH ESTIMATED AVERAGE GLUCOSE RESULT: SIGNIFICANT CHANGE UP (ref 4–5.6)
ALBUMIN SERPL ELPH-MCNC: 2.6 G/DL — LOW (ref 3.3–5)
ALP SERPL-CCNC: 45 U/L — SIGNIFICANT CHANGE UP (ref 40–120)
ALT FLD-CCNC: 11 U/L — LOW (ref 12–78)
ANION GAP SERPL CALC-SCNC: 9 MMOL/L — SIGNIFICANT CHANGE UP (ref 5–17)
AST SERPL-CCNC: 11 U/L — LOW (ref 15–37)
BILIRUB SERPL-MCNC: 0.3 MG/DL — SIGNIFICANT CHANGE UP (ref 0.2–1.2)
BUN SERPL-MCNC: 27 MG/DL — HIGH (ref 7–23)
CALCIUM SERPL-MCNC: 8.4 MG/DL — LOW (ref 8.5–10.1)
CHLORIDE SERPL-SCNC: 98 MMOL/L — SIGNIFICANT CHANGE UP (ref 96–108)
CO2 SERPL-SCNC: 28 MMOL/L — SIGNIFICANT CHANGE UP (ref 22–31)
CREAT SERPL-MCNC: 6.05 MG/DL — HIGH (ref 0.5–1.3)
EGFR: 10 ML/MIN/1.73M2 — LOW
EGFR: 10 ML/MIN/1.73M2 — LOW
ESTIMATED AVERAGE GLUCOSE: 88 MG/DL — SIGNIFICANT CHANGE UP (ref 68–114)
GLUCOSE SERPL-MCNC: 80 MG/DL — SIGNIFICANT CHANGE UP (ref 70–99)
HCT VFR BLD CALC: 27.8 % — LOW (ref 39–50)
HGB BLD-MCNC: 9.5 G/DL — LOW (ref 13–17)
MAGNESIUM SERPL-MCNC: 2.7 MG/DL — HIGH (ref 1.6–2.6)
MCHC RBC-ENTMCNC: 29.8 PG — SIGNIFICANT CHANGE UP (ref 27–34)
MCHC RBC-ENTMCNC: 34.2 G/DL — SIGNIFICANT CHANGE UP (ref 32–36)
MCV RBC AUTO: 87.1 FL — SIGNIFICANT CHANGE UP (ref 80–100)
NRBC BLD AUTO-RTO: 0 /100 WBCS — SIGNIFICANT CHANGE UP (ref 0–0)
PHOSPHATE SERPL-MCNC: 4 MG/DL — SIGNIFICANT CHANGE UP (ref 2.5–4.5)
PLATELET # BLD AUTO: 163 K/UL — SIGNIFICANT CHANGE UP (ref 150–400)
POTASSIUM SERPL-MCNC: 3.8 MMOL/L — SIGNIFICANT CHANGE UP (ref 3.5–5.3)
POTASSIUM SERPL-SCNC: 3.8 MMOL/L — SIGNIFICANT CHANGE UP (ref 3.5–5.3)
PROCALCITONIN SERPL-MCNC: 1.01 NG/ML — HIGH (ref 0.02–0.1)
PROT SERPL-MCNC: 5.4 GM/DL — LOW (ref 6–8.3)
RBC # BLD: 3.19 M/UL — LOW (ref 4.2–5.8)
RBC # FLD: 15 % — HIGH (ref 10.3–14.5)
SODIUM SERPL-SCNC: 135 MMOL/L — SIGNIFICANT CHANGE UP (ref 135–145)
WBC # BLD: 5.49 K/UL — SIGNIFICANT CHANGE UP (ref 3.8–10.5)
WBC # FLD AUTO: 5.49 K/UL — SIGNIFICANT CHANGE UP (ref 3.8–10.5)

## 2025-07-14 PROCEDURE — 99223 1ST HOSP IP/OBS HIGH 75: CPT

## 2025-07-14 PROCEDURE — 99233 SBSQ HOSP IP/OBS HIGH 50: CPT

## 2025-07-14 RX ORDER — BUDESONIDE AND FORMOTEROL FUMARATE DIHYDRATE 80; 4.5 UG/1; UG/1
2 AEROSOL RESPIRATORY (INHALATION)
Refills: 0 | DISCHARGE

## 2025-07-14 RX ORDER — CARVEDILOL 3.12 MG/1
25 TABLET, FILM COATED ORAL EVERY 12 HOURS
Refills: 0 | Status: DISCONTINUED | OUTPATIENT
Start: 2025-07-14 | End: 2025-07-16

## 2025-07-14 RX ORDER — ALBUTEROL SULFATE 2.5 MG/3ML
2 VIAL, NEBULIZER (ML) INHALATION
Refills: 0 | DISCHARGE

## 2025-07-14 RX ORDER — TAMSULOSIN HYDROCHLORIDE 0.4 MG/1
0.4 CAPSULE ORAL AT BEDTIME
Refills: 0 | Status: DISCONTINUED | OUTPATIENT
Start: 2025-07-14 | End: 2025-07-16

## 2025-07-14 RX ORDER — ATORVASTATIN CALCIUM 80 MG/1
40 TABLET, FILM COATED ORAL AT BEDTIME
Refills: 0 | Status: DISCONTINUED | OUTPATIENT
Start: 2025-07-14 | End: 2025-07-16

## 2025-07-14 RX ORDER — NIFEDIPINE 30 MG
30 TABLET, EXTENDED RELEASE 24 HR ORAL DAILY
Refills: 0 | Status: DISCONTINUED | OUTPATIENT
Start: 2025-07-14 | End: 2025-07-16

## 2025-07-14 RX ADMIN — Medication 25 GRAM(S): at 09:31

## 2025-07-14 RX ADMIN — Medication 1 DOSE(S): at 17:45

## 2025-07-14 RX ADMIN — NICOTINE POLACRILEX 1 PATCH: 4 GUM, CHEWING ORAL at 11:11

## 2025-07-14 RX ADMIN — IPRATROPIUM BROMIDE AND ALBUTEROL SULFATE 3 MILLILITER(S): .5; 2.5 SOLUTION RESPIRATORY (INHALATION) at 11:08

## 2025-07-14 RX ADMIN — IPRATROPIUM BROMIDE AND ALBUTEROL SULFATE 3 MILLILITER(S): .5; 2.5 SOLUTION RESPIRATORY (INHALATION) at 17:12

## 2025-07-14 RX ADMIN — Medication 2 TABLET(S): at 21:38

## 2025-07-14 RX ADMIN — Medication 100 MILLIGRAM(S): at 21:38

## 2025-07-14 RX ADMIN — Medication 30 MILLIGRAM(S): at 19:49

## 2025-07-14 RX ADMIN — HEPARIN SODIUM 5000 UNIT(S): 1000 INJECTION INTRAVENOUS; SUBCUTANEOUS at 05:36

## 2025-07-14 RX ADMIN — NICOTINE POLACRILEX 1 PATCH: 4 GUM, CHEWING ORAL at 06:58

## 2025-07-14 RX ADMIN — NICOTINE POLACRILEX 1 PATCH: 4 GUM, CHEWING ORAL at 19:40

## 2025-07-14 RX ADMIN — HEPARIN SODIUM 5000 UNIT(S): 1000 INJECTION INTRAVENOUS; SUBCUTANEOUS at 21:38

## 2025-07-14 RX ADMIN — HEPARIN SODIUM 5000 UNIT(S): 1000 INJECTION INTRAVENOUS; SUBCUTANEOUS at 14:05

## 2025-07-14 RX ADMIN — POLYETHYLENE GLYCOL 3350 17 GRAM(S): 17 POWDER, FOR SOLUTION ORAL at 11:49

## 2025-07-14 RX ADMIN — NICOTINE POLACRILEX 1 PATCH: 4 GUM, CHEWING ORAL at 11:48

## 2025-07-14 RX ADMIN — ARIPIPRAZOLE 30 MILLIGRAM(S): 2 TABLET ORAL at 18:50

## 2025-07-14 RX ADMIN — IPRATROPIUM BROMIDE AND ALBUTEROL SULFATE 3 MILLILITER(S): .5; 2.5 SOLUTION RESPIRATORY (INHALATION) at 23:41

## 2025-07-14 RX ADMIN — IPRATROPIUM BROMIDE AND ALBUTEROL SULFATE 3 MILLILITER(S): .5; 2.5 SOLUTION RESPIRATORY (INHALATION) at 05:29

## 2025-07-14 RX ADMIN — Medication 100 MILLIGRAM(S): at 05:37

## 2025-07-14 RX ADMIN — Medication 1 APPLICATION(S): at 05:36

## 2025-07-14 RX ADMIN — Medication 25 GRAM(S): at 21:38

## 2025-07-14 RX ADMIN — CARVEDILOL 25 MILLIGRAM(S): 3.12 TABLET, FILM COATED ORAL at 19:50

## 2025-07-14 RX ADMIN — Medication 1 DOSE(S): at 05:37

## 2025-07-14 RX ADMIN — ATORVASTATIN CALCIUM 40 MILLIGRAM(S): 80 TABLET, FILM COATED ORAL at 21:38

## 2025-07-14 RX ADMIN — Medication 100 MILLIGRAM(S): at 14:05

## 2025-07-14 RX ADMIN — TAMSULOSIN HYDROCHLORIDE 0.4 MILLIGRAM(S): 0.4 CAPSULE ORAL at 21:38

## 2025-07-14 NOTE — PROGRESS NOTE ADULT - ASSESSMENT
59M w/ bipolar disease, HTN, CAD, HFrEF, COPD - active smoker 1 PPM, ESRD on HD due to PKD. Presents w/ SOB and cough. In ED, pt hypoxemic and hypertensive and febrile. Placed on BiPAP but had episode of vomiting and possible aspiration requiring for intubation. Imaging reveals bibasilar consolidations. PT admitted to icu and started on zosyn for empiric aspiration coverage. Pt S/P HD. Extubated. Pulm consulted for follow up.    acute resp failure w/hypoxia intubated 7/12 for HRF 2/2 flash pulm edema.   acute pulmonary edema  copd  Acute on chronic syst [facility]  Gram-neg pna  ESRD on HD.   Cont dialysis per renal  HTN  - cont zosyn and zithro for 5 day course for aspiration pna. cx ngtd  - cont to uptitrate antiHTNs for BP  - cont duonebs for now for copd and active smoking with benefit of airway clearance.   PT grzegorz

## 2025-07-14 NOTE — DIETITIAN INITIAL EVALUATION ADULT - PERTINENT LABORATORY DATA
07-14    135  |  98  |  27[H]  ----------------------------<  80  3.8   |  28  |  6.05[H]    Ca    8.4[L]      14 Jul 2025 03:40  Phos  4.0     07-14  Mg     2.7     07-14    TPro  5.4[L]  /  Alb  2.6[L]  /  TBili  0.3  /  DBili  x   /  AST  11[L]  /  ALT  11[L]  /  AlkPhos  45  07-14  A1C with Estimated Average Glucose Result: See Note (07-13-25 @ 04:40)

## 2025-07-14 NOTE — CONSULT NOTE ADULT - ASSESSMENT
59M w/ bipolar disease, HTN, CAD, HFmrEF, COPD - active smoker 1 PPM, ESRD on HD due to PKD. Presents w/ SOB and cough. In ED, pt hypoxemic and hypertensive and febrile. Placed on BiPAP but had episode of vomiting and possible aspiration requiring for intubation. Imaging reveals bibasilar consolidations. Pt admitted to icu for further mngmt. PT now extubated, downgraded and pulm consulted for follow up.    recs:    - intubated 7/12 for HRF 2/2 flash pulm edema. ESRD on HD. S/p 2.5L removed with HD yest and extubated yest to RA. Cont dialysis per renal  - cont zosyn and zithro for 5 day course for aspiration pna. cx ngtd  - cont to uptitrate antiHTNs for BP  - cont duonebs for now for copd and active smoking with benefit of airway clearance.     note incomplete 59M w/ bipolar disease, HTN, CAD, HFmrEF, COPD - active smoker 1 PPM, ESRD on HD due to PKD. Presents w/ SOB and cough. In ED, pt hypoxemic and hypertensive and febrile. Placed on BiPAP but had episode of vomiting and possible aspiration requiring for intubation. Imaging reveals bibasilar consolidations. Pt admitted to icu for further mngmt. PT now extubated, downgraded and pulm consulted for follow up.    recs:    - intubated 7/12 for HRF 2/2 flash pulm edema. ESRD on HD. S/p 2.5L removed with HD yest and extubated yest to RA. Cont dialysis per renal  - cont zosyn and zithro for 5 day course for aspiration pna. cx ngtd  - cont to uptitrate antiHTNs for BP  - cont duonebs for now for copd and active smoking with benefit of airway clearance.

## 2025-07-14 NOTE — CONSULT NOTE ADULT - SUBJECTIVE AND OBJECTIVE BOX
CHIEF COMPLAINT: sob    HPI:  59M w/ bipolar disease, HTN, CAD, HFmrEF, COPD - active smoker 1 PPM, ESRD on HD due to PKD. Presents w/ SOB and cough. In ED, pt hypoxemic and hypertensive and febrile. Placed on BiPAP but had episode of vomiting and possible aspiration requiring for intubation. Imaging reveals bibasilar consolidations. PT admitted to icu and started on zosyn for empiric aspiration coverage. Pt S/P HD yesterday with ~2.5L removed. Extubated yest to NC and now sitting in a chair. Pt downgraded to medicine. Pulm consulted for follow up.    Subjective:     PAST MEDICAL & SURGICAL HISTORY:  Diabetes      Hypertension      Hyperlipidemia      CAD (coronary artery disease)      ESRD on dialysis      Polycystic kidney disease      BPH (benign prostatic hyperplasia)      Bipolar disorder      Schizophrenia          FAMILY HISTORY:      SOCIAL HISTORY:  Smoking: __ packs x ___ years  EtOH Use:  Marital Status:  Occupation:  Recent Travel:  Country of Birth:  Advance Directives:    Allergies    No Known Allergies    Intolerances        HOME MEDICATIONS:    REVIEW OF SYSTEMS:  Constitutional:   Eyes:  ENT:  CV:  Resp:  GI:  :  MSK:  Integumentary:  Neurological:  Psychiatric:  Endocrine:  Hematologic/Lymphatic:  Allergic/Immunologic:  [ ] All other systems negative  [ ] Unable to assess ROS because ________    OBJECTIVE:  ICU Vital Signs Last 24 Hrs  T(C): 36.1 (14 Jul 2025 05:33), Max: 37.3 (13 Jul 2025 11:00)  T(F): 97 (14 Jul 2025 05:33), Max: 99.1 (13 Jul 2025 11:00)  HR: 87 (14 Jul 2025 07:25) (70 - 97)  BP: 123/63 (14 Jul 2025 07:25) (123/63 - 176/85)  BP(mean): 80 (14 Jul 2025 07:25) (80 - 113)  ABP: --  ABP(mean): --  RR: 18 (14 Jul 2025 07:25) (11 - 23)  SpO2: 96% (14 Jul 2025 07:25) (96% - 100%)    O2 Parameters below as of 14 Jul 2025 07:25  Patient On (Oxygen Delivery Method): room air              07-13 @ 07:01  -  07-14 @ 07:00  --------------------------------------------------------  IN: 1220 mL / OUT: 250 mL / NET: 970 mL      CAPILLARY BLOOD GLUCOSE          PHYSICAL EXAM:  General:   HEENT:   Lymph Nodes:  Neck:   Respiratory:   Cardiovascular:   Abdomen:   Extremities:   Skin:   Neurological:      HOSPITAL MEDICATIONS:  MEDICATIONS  (STANDING):  albuterol/ipratropium for Nebulization 3 milliLiter(s) Nebulizer every 6 hours  ARIPiprazole 30 milliGRAM(s) Oral daily  chlorhexidine 2% Cloths 1 Application(s) Topical <User Schedule>  fluticasone propionate/ salmeterol 100-50 MICROgram(s) Diskus 1 Dose(s) Inhalation two times a day  heparin   Injectable 5000 Unit(s) SubCutaneous every 8 hours  hydrALAZINE 100 milliGRAM(s) Oral every 8 hours  nicotine - 21 mG/24Hr(s) Patch 1 Patch Transdermal daily  piperacillin/tazobactam IVPB.. 3.375 Gram(s) IV Intermittent every 12 hours  polyethylene glycol 3350 17 Gram(s) Oral daily  senna 2 Tablet(s) Oral at bedtime    MEDICATIONS  (PRN):  benzocaine/menthol Lozenge 1 Lozenge Oral four times a day PRN Sore Throat      LABS:                        9.5    5.49  )-----------( 163      ( 14 Jul 2025 03:40 )             27.8     07-14    135  |  98  |  27[H]  ----------------------------<  80  3.8   |  28  |  6.05[H]    Ca    8.4[L]      14 Jul 2025 03:40  Phos  4.0     07-14  Mg     2.7     07-14    TPro  5.4[L]  /  Alb  2.6[L]  /  TBili  0.3  /  DBili  x   /  AST  11[L]  /  ALT  11[L]  /  AlkPhos  45  07-14      Urinalysis Basic - ( 14 Jul 2025 03:40 )    Color: x / Appearance: x / SG: x / pH: x  Gluc: 80 mg/dL / Ketone: x  / Bili: x / Urobili: x   Blood: x / Protein: x / Nitrite: x   Leuk Esterase: x / RBC: x / WBC x   Sq Epi: x / Non Sq Epi: x / Bacteria: x      Arterial Blood Gas:  07-13 @ 04:00  7.56/36/125/32/99.0/9.5  ABG lactate: --        MICROBIOLOGY: reviewed     RADIOLOGY:  [x ] Reviewed and interpreted by me    EKG: reviewed  CHIEF COMPLAINT: sob    HPI:  59M w/ bipolar disease, HTN, CAD, HFmrEF, COPD - active smoker 1 PPM, ESRD on HD due to PKD. Presents w/ SOB and cough. In ED, pt hypoxemic and hypertensive and febrile. Placed on BiPAP but had episode of vomiting and possible aspiration requiring for intubation. Imaging reveals bibasilar consolidations. PT admitted to icu and started on zosyn for empiric aspiration coverage. Pt S/P HD yesterday with ~2.5L removed. Extubated yest to NC and now sitting in a chair. Pt downgraded to medicine. Pulm consulted for follow up.    Subjective:   on room air, no SOB    PAST MEDICAL & SURGICAL HISTORY:  Diabetes      Hypertension      Hyperlipidemia      CAD (coronary artery disease)      ESRD on dialysis      Polycystic kidney disease      BPH (benign prostatic hyperplasia)      Bipolar disorder      Schizophrenia          FAMILY HISTORY:      SOCIAL HISTORY:  SmokinPPD x 40 years  EtOH Use:  Marital Status:  Occupation:  Recent Travel:  Country of Birth:  Advance Directives:    Allergies  No Known Allergies          REVIEW OF SYSTEMS:  Constitutional: no fever, no chills, no weight loss  Eyes: denies  ENT: denies  CV: no palpitations no CP  Resp: no SOB, no wheeze, no cough  GI: denies  : denies  MSK: denies  Integumentary:  Neurological: denies  Psychiatric:  Endocrine:  Hematologic/Lymphatic:  Allergic/Immunologic:  [x ] All other systems negative      OBJECTIVE:  ICU Vital Signs Last 24 Hrs  T(C): 36.1 (2025 05:33), Max: 37.3 (2025 11:00)  T(F): 97 (2025 05:33), Max: 99.1 (2025 11:00)  HR: 87 (2025 07:25) (70 - 97)  BP: 123/63 (2025 07:25) (123/63 - 176/85)  BP(mean): 80 (2025 07:25) (80 - 113)  RR: 18 (2025 07:25) (11 - 23)  SpO2: 96% (2025 07:25) (96% - 100%)    O2 Parameters below as of 2025 07:25  Patient On (Oxygen Delivery Method): room air              -13 @ 07:01  -  -14 @ 07:00  --------------------------------------------------------  IN: 1220 mL / OUT: 250 mL / NET: 970 mL      CAPILLARY BLOOD GLUCOSE          PHYSICAL EXAM:  General: NAD, comfortable in bed  HEENT: NC/AT, EOMI slcera white  Neck: supple, no JVD  Respiratory: CTA bilaterally, no wheeze, no rhonchi  Cardiovascular: regular rate  Abdomen: soft, NT, ND, + BS  Extremities: no edema/cyanosis/clubbing  Skin: warm, dry  Neurological: AAOx3      HOSPITAL MEDICATIONS:  MEDICATIONS  (STANDING):  albuterol/ipratropium for Nebulization 3 milliLiter(s) Nebulizer every 6 hours  ARIPiprazole 30 milliGRAM(s) Oral daily  chlorhexidine 2% Cloths 1 Application(s) Topical <User Schedule>  fluticasone propionate/ salmeterol 100-50 MICROgram(s) Diskus 1 Dose(s) Inhalation two times a day  heparin   Injectable 5000 Unit(s) SubCutaneous every 8 hours  hydrALAZINE 100 milliGRAM(s) Oral every 8 hours  nicotine - 21 mG/24Hr(s) Patch 1 Patch Transdermal daily  piperacillin/tazobactam IVPB.. 3.375 Gram(s) IV Intermittent every 12 hours  polyethylene glycol 3350 17 Gram(s) Oral daily  senna 2 Tablet(s) Oral at bedtime    MEDICATIONS  (PRN):  benzocaine/menthol Lozenge 1 Lozenge Oral four times a day PRN Sore Throat      LABS:                        9.5    5.49  )-----------( 163      ( 2025 03:40 )             27.8     07-14    135  |  98  |  27[H]  ----------------------------<  80  3.8   |  28  |  6.05[H]    Ca    8.4[L]      2025 03:40  Phos  4.0     07-14  Mg     2.7     07-14    TPro  5.4[L]  /  Alb  2.6[L]  /  TBili  0.3  /  DBili  x   /  AST  11[L]  /  ALT  11[L]  /  AlkPhos  45  07-14        Arterial Blood Gas:   @ 04:00  7.56/36/125/32/99.0/9.5          MICROBIOLOGY: reviewed   S. pneumoniae &amp; L. pneumophila Ag, Urine (25 @ 13:00)    Streptococcus pneumoniae antigen: Negative   Legionella pneumophila antigen: Negative      MRSA/MSSA PCR (25 @ 08:10)    MRSA PCR Result.: NotDetec   Staph aureus PCR Result: NotDetec      Culture - Sputum . (25 @ 10:00)   Specimen Source: ET Tube ET Tube   Culture Results: Commensal jo ann consistent with body site      Culture - Blood (25 @ 02:17)    Specimen Source: Blood Blood-Peripheral   Culture Results: No growth at 48 Hours          RADIOLOGY:  [x ] Reviewed and interpreted by me    < from: Xray Chest 1 View- PORTABLE-Urgent (Xray Chest 1 View- PORTABLE-Urgent .) (25 @ 07:06) >  ET tube in satisfactory position.  Enteric tube tip in stomach.  Heart is top normal in size.  Mild improvement in bilateral lung opacities due to improving congestion   and/or pneumonia.  No pleural effusion or pneumothorax.    IMPRESSION:    Mild improvement in pulmonary congestion and/or pneumonia.    ------------------  < from: CT Chest No Cont (25 @ 04:45) >  CHEST:  LUNGS AND LARGE AIRWAYS: Tracheostomy tube. Patent central airways.   Prominent bibasilar airspace opacities and to a lesser extent in the   bilateral upper lobes, left greater than right. Findings may represent   infection and/or pulmonary edema. Correlate clinically. Recommend   short-term follow-up noncontrast chest CT to assess for resolution.  PLEURA: Small bilateral effusions.  VESSELS: Within normal limits.  HEART: Heart size is normal. No pericardial effusion. Cardiac stent.  MEDIASTINUM AND ANILA: No lymphadenopathy.  CHEST WALL AND LOWER NECK: Within normal limits.    ABDOMEN AND PELVIS:  LIVER: Within normal limits.  BILE DUCTS: Normal caliber.  GALLBLADDER: Within normal limits.  SPLEEN: Within normal limits.  PANCREAS: Within normal limits.  ADRENALS: Within normal limits.  KIDNEYS/URETERS: Bilateral markedly enlarged polycystic kidneys. No   hydronephrosis. Small calcifications in several dense cysts likely   hemorrhagic or proteinaceous.    BLADDER: The urinary bladder is collapsed around a Guthrie catheter   limiting evaluation.  REPRODUCTIVE ORGANS: Prostate within normal limits.    BOWEL: No bowel obstruction. Appendix is normal.  PERITONEUM/RETROPERITONEUM: Within normal limits.  VESSELS: Atherosclerotic changes.  LYMPH NODES: No lymphadenopathy.  ABDOMINAL WALL: Within normal limits.  BONES: Within normal limits.    IMPRESSION:    Limited noncontrast examination.    Prominent bibasilar airspace opacities and to a lesser extent in the   bilateral upper lobes, left greater than right. Findings may represent   infection and/or pulmonary edema. Correlate clinically. Recommend   short-term follow-up noncontrast chest CT to assess for resolution.    Enlarged bilateral polycystic kidneys.    -------------------  < from: TTE Echo Complete w/o Contrast w/ Doppler (25 @ 10:41) >  CONCLUSIONS:     1. Technically difficult image quality.   2. Left ventricular cavity is normal in size. Left ventricular systolic   function is low-normal with an ejectionfraction of 50 % by Diez's   method of disks.   3. There is mild (grade 1) left ventricular diastolic dysfunction.   4. Aortic root at the sinuses of Valsalva is normal in size, measuring   3.20 cm (indexed 1.81 cm/m²).   5. Mild mitral regurgitation.   6. Pulmonic valve was not well visualized.   7. Mild tricuspid regurgitation.   8. Trileaflet aortic valve with normal systolic excursion. No aortic   valve stenosis.   9. No evidence of aortic regurgitation.  10. Left pleural effusion noted.  11. Small pericardial effusion with no echocardiographic evidence of   tamponade physiology.

## 2025-07-14 NOTE — DIETITIAN INITIAL EVALUATION ADULT - OTHER CALCULATIONS
For the purpose of this assessment bed scale weight of 67 kg obtained 7/14 used to calculate energy needs

## 2025-07-14 NOTE — PROGRESS NOTE ADULT - SUBJECTIVE AND OBJECTIVE BOX
[facility] NEPHROLOGY SERVICES, [facility]  NEPHROLOGY AND HYPERTENSION  [address]  [address]  [place] [address]  [phone]    MD ANSELMO UP, MD HOLLI JEFFERSON, MD STELLA BAUM, MD TI HIGGINS MD          Patient events noted  No distress      MEDICATIONS  (STANDING):  albuterol/ipratropium for Nebulization 3 milliLiter(s) Nebulizer every 6 hours  ARIPiprazole 30 milliGRAM(s) Oral daily  chlorhexidine 2% Cloths 1 Application(s) Topical <User Schedule>  fluticasone propionate/ salmeterol 100-50 MICROgram(s) Diskus 1 Dose(s) Inhalation two times a day  heparin   Injectable 5000 Unit(s) SubCutaneous every 8 hours  hydrALAZINE 100 milliGRAM(s) Oral every 8 hours  nicotine - 21 mG/24Hr(s) Patch 1 Patch Transdermal daily  piperacillin/tazobactam IVPB.. 3.375 Gram(s) IV Intermittent every 12 hours  polyethylene glycol 3350 17 Gram(s) Oral daily  senna 2 Tablet(s) Oral at bedtime    MEDICATIONS  (PRN):  benzocaine/menthol Lozenge 1 Lozenge Oral four times a day PRN Sore Throat      07-13-25 @ 07:01  -  07-14-25 @ 07:00  --------------------------------------------------------  IN: 1220 mL / OUT: 250 mL / NET: 970 mL    07-14-25 @ 07:01  -  07-14-25 @ 18:34  --------------------------------------------------------  IN: 100 mL / OUT: 110 mL / NET: -10 mL      PHYSICAL EXAM:      T(C): 36 (07-14-25 @ 16:15), Max: 36.1 (07-14-25 @ 05:33)  HR: 91 (07-14-25 @ 17:49) (70 - 95)  BP: 171/86 (07-14-25 @ 17:49) (123/63 - 177/90)  RR: 21 (07-14-25 @ 17:49) (11 - 24)  SpO2: 100% (07-14-25 @ 17:49) (96% - 100%)  Wt(kg): --  Lungs clear  Heart S1S2  Abd soft NT ND  Extremities:   tr edema                                    9.5    5.49  )-----------( 163      ( 14 Jul 2025 03:40 )             27.8     07-14    135  |  98  |  27[H]  ----------------------------<  80  3.8   |  28  |  6.05[H]    Ca    8.4[L]      14 Jul 2025 03:40  Phos  4.0     07-14  Mg     2.7     07-14    TPro  5.4[L]  /  Alb  2.6[L]  /  TBili  0.3  /  DBili  x   /  AST  11[L]  /  ALT  11[L]  /  AlkPhos  45  07-14    ABG - ( 13 Jul 2025 04:00 )  pH, Arterial: 7.56  pH, Blood: x     /  pCO2: 36    /  pO2: 125   / HCO3: 32    / Base Excess: 9.5   /  SaO2: 99.0              LIVER FUNCTIONS - ( 14 Jul 2025 03:40 )  Alb: 2.6 g/dL / Pro: 5.4 gm/dL / ALK PHOS: 45 U/L / ALT: 11 U/L / AST: 11 U/L / GGT: x           Creatinine Trend: 6.05<--, 4.41<--, 6.22<--, 6.33<--      Impression:  * HD dependent ESRD. Usual outpt schedule TTS  * Resp failure due to pulm edema and possibly aspiration. Extubated.   * Hypertensive urgency. Better.     Recommendations:  No dialytic indications today.   Next HD will be tomorrow  Yakov Tripathi MD

## 2025-07-14 NOTE — PROGRESS NOTE ADULT - SUBJECTIVE AND OBJECTIVE BOX
Patient is a 59y old  Male who presents with a chief complaint of Intubated, Sepsis, PNA, HF (14 Jul 2025 14:12)      OVERNIGHT EVENTS:  Patients seen and examined at bedside this morning. No acute events overnight.    REVIEW OF SYSTEMS: denies chest pain/SOB, diaphoresis, no F/C, cough, dizziness, headache, blurry vision, nausea, vomiting, abdominal pain. All others review of systems negative     MEDICATIONS  (STANDING):  albuterol/ipratropium for Nebulization 3 milliLiter(s) Nebulizer every 6 hours  ARIPiprazole 30 milliGRAM(s) Oral daily  chlorhexidine 2% Cloths 1 Application(s) Topical <User Schedule>  fluticasone propionate/ salmeterol 100-50 MICROgram(s) Diskus 1 Dose(s) Inhalation two times a day  heparin   Injectable 5000 Unit(s) SubCutaneous every 8 hours  hydrALAZINE 100 milliGRAM(s) Oral every 8 hours  nicotine - 21 mG/24Hr(s) Patch 1 Patch Transdermal daily  piperacillin/tazobactam IVPB.. 3.375 Gram(s) IV Intermittent every 12 hours  polyethylene glycol 3350 17 Gram(s) Oral daily  senna 2 Tablet(s) Oral at bedtime    MEDICATIONS  (PRN):  benzocaine/menthol Lozenge 1 Lozenge Oral four times a day PRN Sore Throat      Allergies    No Known Allergies    Intolerances        T(F): 96.8 (07-14-25 @ 11:47), Max: 97 (07-14-25 @ 05:33)  HR: 82 (07-14-25 @ 14:05) (70 - 97)  BP: 177/90 (07-14-25 @ 14:05) (123/63 - 177/90)  RR: 24 (07-14-25 @ 14:05) (11 - 24)  SpO2: 97% (07-14-25 @ 14:05) (96% - 100%)  Wt(kg): --    PHYSICAL EXAM:  GENERAL: NAD  HEAD:  Atraumatic, Normocephalic  EYES: PERRLA, conjunctiva and sclera clear  ENMT: Moist mucous membranes  NECK: Supple, No JVD, Normal thyroid  NERVOUS SYSTEM:  Alert & Awake  CHEST/LUNG: Clear to percussion bilaterally;   HEART: Regular rate and rhythm;   ABDOMEN: Soft, Nontender, Nondistended; Bowel sounds present  EXTREMITIES:  no edema BL LE  SKIN: moist    LABS:                        9.5    5.49  )-----------( 163      ( 14 Jul 2025 03:40 )             27.8     07-14    135  |  98  |  27[H]  ----------------------------<  80  3.8   |  28  |  6.05[H]    Ca    8.4[L]      14 Jul 2025 03:40  Phos  4.0     07-14  Mg     2.7     07-14    TPro  5.4[L]  /  Alb  2.6[L]  /  TBili  0.3  /  DBili  x   /  AST  11[L]  /  ALT  11[L]  /  AlkPhos  45  07-14      Urinalysis Basic - ( 14 Jul 2025 03:40 )    Color: x / Appearance: x / SG: x / pH: x  Gluc: 80 mg/dL / Ketone: x  / Bili: x / Urobili: x   Blood: x / Protein: x / Nitrite: x   Leuk Esterase: x / RBC: x / WBC x   Sq Epi: x / Non Sq Epi: x / Bacteria: x      Cultures;   CAPILLARY BLOOD GLUCOSE        Lipid panel:   LDL --            RADIOLOGY & ADDITIONAL TESTS:    Imaging Personally Reviewed:  [x ] YES      Consultant(s) Notes Reviewed:  [x ] YES     Care Discussed with [x ] Consultants [X ] Patient [ ] Family  [x ]    [x ]  Other; RN

## 2025-07-14 NOTE — CONSULT NOTE ADULT - NS ATTEND AMEND GEN_ALL_CORE FT
pt seen and examined at bedside    admitted 7/12/25 for SOB. noted to be hypoxic to 62% by EMS, hypertensive /120  reported to have vomited in ED with concerns for aspiration and intubated in ER  admitted to ICU for hypoxic respiratory failure.   no leukocytosis  proBNP 20333  dilayzed with fluid removed  extubated 7/13 to RA  Downgraded to medical service    sputum cx with commensal jo ann  on room air  flu/covid/rsv negative  strep/legionella neg  ========  current 1PPD smoker x40 years  states he does not see a pulmonologist  EMR indicates that pt was prescribed albuterol and Symbicort in June 59M active smoker 1PPD x 40 years PMH HTN, HLD, CAD s/p PCI, HFmrEF, COPD (not oxygen dependent), polycystic kidney disease, ESRD on HD (T-Th-Sat), bipolar disease presents for SOB and cough. Hypoxic to the 60s and hypertensive 200s/120s with fever on presentation. Placed on BiPAP but had episode of vomiting and possible aspiration requiring for intubation. Imaging reveals bibasilar consolidations. Admitted to ICU for further management. proBNP 20333. Initial arterial blood gas 7.26/61/69/35/93%. No leukocytosis. s/p HD with 2.5L fluid removal on 7/12. Extubated 7/13 to RA and downgraded to medical service. Pulmonary consulted for follow up.    DX: acute hypoxic and hypercarbic respiratory failure, respiratory failure requiring intubation, acute on chronic HFmrEF, pulmonary congestion, aspiration PNA, hypertensive urgency, COPD, active smoker, ESRD on HD, polycystic kidney disease    - weaned and extubated yesterday  - doing well on RA  - goal to maintain o2 sat > 90%  - will need to check RA sat with ambulation  - cont fluid removal with HD for underlying CHF  - HD per nephrology   - on zosyn (day 3) for aspiration PNA  - 5 day course of antibx should be sufficient to treat PNA  - sputum cx with commensal jo ann  - strep/legionella negative  - on symbicort and prn albuterol per outpatient medication records  - was started on symbicort hospital interchange with advair  - currently on duonebs around the clock, if respiratory status remains stable can change to prn frequency  - nicotine patch for smoking hx  - incentive spirometer  - BP management per medical team  - OOB to chair  - remainder of care per primary team

## 2025-07-14 NOTE — DIETITIAN INITIAL EVALUATION ADULT - OTHER INFO
Pt seen and family members present at time of visit. Per family pt does follow a renal diet 80% of the time, does drink berry flavored Nepro supplements and has been on dialysis for the past 3.5 years. Family member stated no need for renal diet review at this time. Pt stated height of 5'7",  lbs and unable to recall any weight changes recently. Per [facility] weight trend history pt noted to be 160 lbs back in 2019, current weight of 148 lbs, suggests 12lbs (7.5%) weight loss since then. Pt does appear thin per RD observation. Family agreeable to have pt try vanilla flavor Nepro as berry flavor unavailable. Pt seen and family members present at time of visit. Per family pt does follow a renal diet 80% of the time, consumes 2 meals a day, does drink berry flavored Nepro supplements and has been on dialysis for the past 3.5 years. Family member stated no need for renal diet review at this time. Pt stated height of 5'7",  lbs and unable to recall any weight changes recently. Per [facility] weight trend history pt noted to be 160 lbs back in 2019, current weight of 148 lbs, suggests 12lbs (7.5%) weight loss since then. Pt does appear thin per RD observation and breakfast tray untouched. Family agreeable to have pt try vanilla flavor Nepro as berry flavor unavailable and stated will bring pt some home made food next time. Pt intubated 7/12, extubated 7/13, HD session yesterday removed 2.5L, TF dicontinued and diet advanced. Pt seen and family members present at time of visit. Per family pt does follow a renal diet 80% of the time, consumes 2 meals a day, does drink berry flavored Nepro supplements and has been on dialysis for the past 3.5 years. Family member stated no need for renal diet review at this time. Pt stated height of 5'7",  lbs and unable to recall any weight changes recently. Per [facility] weight trend history pt noted to be 160 lbs back in 2019, current weight of 148 lbs, suggests 12lbs (7.5%) weight loss since then. Pt does appear thin per RD observation and breakfast tray untouched. Family agreeable to have pt try vanilla flavor Nepro as berry flavor unavailable and stated will bring pt some home made food next time.

## 2025-07-15 LAB
ANION GAP SERPL CALC-SCNC: 12 MMOL/L — SIGNIFICANT CHANGE UP (ref 5–17)
BUN SERPL-MCNC: 34 MG/DL — HIGH (ref 7–23)
CALCIUM SERPL-MCNC: 8.3 MG/DL — LOW (ref 8.5–10.1)
CHLORIDE SERPL-SCNC: 97 MMOL/L — SIGNIFICANT CHANGE UP (ref 96–108)
CO2 SERPL-SCNC: 25 MMOL/L — SIGNIFICANT CHANGE UP (ref 22–31)
CREAT SERPL-MCNC: 8.34 MG/DL — HIGH (ref 0.5–1.3)
EGFR: 7 ML/MIN/1.73M2 — LOW
EGFR: 7 ML/MIN/1.73M2 — LOW
GLUCOSE SERPL-MCNC: 123 MG/DL — HIGH (ref 70–99)
HCT VFR BLD CALC: 29.5 % — LOW (ref 39–50)
HGB BLD-MCNC: 10.1 G/DL — LOW (ref 13–17)
MCHC RBC-ENTMCNC: 29.4 PG — SIGNIFICANT CHANGE UP (ref 27–34)
MCHC RBC-ENTMCNC: 34.2 G/DL — SIGNIFICANT CHANGE UP (ref 32–36)
MCV RBC AUTO: 86 FL — SIGNIFICANT CHANGE UP (ref 80–100)
NRBC BLD AUTO-RTO: 0 /100 WBCS — SIGNIFICANT CHANGE UP (ref 0–0)
PLATELET # BLD AUTO: 174 K/UL — SIGNIFICANT CHANGE UP (ref 150–400)
POTASSIUM SERPL-MCNC: 4.1 MMOL/L — SIGNIFICANT CHANGE UP (ref 3.5–5.3)
POTASSIUM SERPL-SCNC: 4.1 MMOL/L — SIGNIFICANT CHANGE UP (ref 3.5–5.3)
RBC # BLD: 3.43 M/UL — LOW (ref 4.2–5.8)
RBC # FLD: 15.2 % — HIGH (ref 10.3–14.5)
SODIUM SERPL-SCNC: 134 MMOL/L — LOW (ref 135–145)
WBC # BLD: 6.95 K/UL — SIGNIFICANT CHANGE UP (ref 3.8–10.5)
WBC # FLD AUTO: 6.95 K/UL — SIGNIFICANT CHANGE UP (ref 3.8–10.5)

## 2025-07-15 PROCEDURE — 99232 SBSQ HOSP IP/OBS MODERATE 35: CPT

## 2025-07-15 PROCEDURE — 99233 SBSQ HOSP IP/OBS HIGH 50: CPT

## 2025-07-15 RX ORDER — BISACODYL 5 MG
10 TABLET, DELAYED RELEASE (ENTERIC COATED) ORAL ONCE
Refills: 0 | Status: COMPLETED | OUTPATIENT
Start: 2025-07-15 | End: 2025-07-15

## 2025-07-15 RX ADMIN — NICOTINE POLACRILEX 1 PATCH: 4 GUM, CHEWING ORAL at 07:29

## 2025-07-15 RX ADMIN — HEPARIN SODIUM 5000 UNIT(S): 1000 INJECTION INTRAVENOUS; SUBCUTANEOUS at 05:50

## 2025-07-15 RX ADMIN — NICOTINE POLACRILEX 1 PATCH: 4 GUM, CHEWING ORAL at 14:29

## 2025-07-15 RX ADMIN — IPRATROPIUM BROMIDE AND ALBUTEROL SULFATE 3 MILLILITER(S): .5; 2.5 SOLUTION RESPIRATORY (INHALATION) at 05:51

## 2025-07-15 RX ADMIN — TAMSULOSIN HYDROCHLORIDE 0.4 MILLIGRAM(S): 0.4 CAPSULE ORAL at 21:50

## 2025-07-15 RX ADMIN — Medication 1 APPLICATION(S): at 05:50

## 2025-07-15 RX ADMIN — Medication 1 DOSE(S): at 18:21

## 2025-07-15 RX ADMIN — NICOTINE POLACRILEX 1 PATCH: 4 GUM, CHEWING ORAL at 19:55

## 2025-07-15 RX ADMIN — CARVEDILOL 25 MILLIGRAM(S): 3.12 TABLET, FILM COATED ORAL at 05:49

## 2025-07-15 RX ADMIN — ATORVASTATIN CALCIUM 40 MILLIGRAM(S): 80 TABLET, FILM COATED ORAL at 21:50

## 2025-07-15 RX ADMIN — CARVEDILOL 25 MILLIGRAM(S): 3.12 TABLET, FILM COATED ORAL at 18:15

## 2025-07-15 RX ADMIN — HEPARIN SODIUM 5000 UNIT(S): 1000 INJECTION INTRAVENOUS; SUBCUTANEOUS at 15:09

## 2025-07-15 RX ADMIN — HEPARIN SODIUM 5000 UNIT(S): 1000 INJECTION INTRAVENOUS; SUBCUTANEOUS at 21:50

## 2025-07-15 RX ADMIN — Medication 1 DOSE(S): at 05:49

## 2025-07-15 RX ADMIN — NICOTINE POLACRILEX 1 PATCH: 4 GUM, CHEWING ORAL at 15:07

## 2025-07-15 RX ADMIN — ARIPIPRAZOLE 30 MILLIGRAM(S): 2 TABLET ORAL at 18:15

## 2025-07-15 RX ADMIN — Medication 100 MILLIGRAM(S): at 05:49

## 2025-07-15 RX ADMIN — Medication 100 MILLIGRAM(S): at 22:13

## 2025-07-15 RX ADMIN — Medication 25 GRAM(S): at 21:50

## 2025-07-15 RX ADMIN — Medication 2 TABLET(S): at 21:50

## 2025-07-15 RX ADMIN — Medication 100 MILLIGRAM(S): at 14:15

## 2025-07-15 NOTE — PHYSICAL THERAPY INITIAL EVALUATION ADULT - GENERAL OBSERVATIONS, REHAB EVAL
Pt encountered returning from dialysis, sitting at EOB, axox4; reports 0/10 pain. SPT Tammy + wife present.

## 2025-07-15 NOTE — PHYSICAL THERAPY INITIAL EVALUATION ADULT - ADDITIONAL COMMENTS
Per wife (able to translate to Polish): Pt lives in pvt house w/ wife. 5-6 OTONIEL  w/ b/l rails (close), no additional steps required inside. Owns no DME and is independent w/ all ADL's and functional mobility.

## 2025-07-15 NOTE — PROGRESS NOTE ADULT - SUBJECTIVE AND OBJECTIVE BOX
[facility] NEPHROLOGY SERVICES, [facility]  NEPHROLOGY AND HYPERTENSION  [address]  [address]  [place] [address]  [phone]    MD ANSELMO UP, MD HIPOLITO VALVERDE MD CHRISTOPHER CAPUTO, MD TI HIGGINS MD          Patient events noted    MEDICATIONS  (STANDING):  albuterol/ipratropium for Nebulization 3 milliLiter(s) Nebulizer every 6 hours  ARIPiprazole 30 milliGRAM(s) Oral daily  atorvastatin 40 milliGRAM(s) Oral at bedtime  carvedilol 25 milliGRAM(s) Oral every 12 hours  chlorhexidine 2% Cloths 1 Application(s) Topical <User Schedule>  fluticasone propionate/ salmeterol 100-50 MICROgram(s) Diskus 1 Dose(s) Inhalation two times a day  heparin   Injectable 5000 Unit(s) SubCutaneous every 8 hours  hydrALAZINE 100 milliGRAM(s) Oral every 8 hours  nicotine - 21 mG/24Hr(s) Patch 1 Patch Transdermal daily  NIFEdipine XL 30 milliGRAM(s) Oral daily  piperacillin/tazobactam IVPB.. 3.375 Gram(s) IV Intermittent every 12 hours  polyethylene glycol 3350 17 Gram(s) Oral daily  senna 2 Tablet(s) Oral at bedtime  tamsulosin 0.4 milliGRAM(s) Oral at bedtime    MEDICATIONS  (PRN):  benzocaine/menthol Lozenge 1 Lozenge Oral four times a day PRN Sore Throat      07-14-25 @ 07:01  -  07-15-25 @ 07:00  --------------------------------------------------------  IN: 100 mL / OUT: 110 mL / NET: -10 mL    07-15-25 @ 07:01  -  07-15-25 @ 16:15  --------------------------------------------------------  IN: 0 mL / OUT: 3000 mL / NET: -3000 mL      PHYSICAL EXAM:      T(C): 36.6 (07-15-25 @ 14:25), Max: 36.7 (07-14-25 @ 23:47)  HR: 90 (07-15-25 @ 14:25) (76 - 101)  BP: 177/98 (07-15-25 @ 14:25) (156/83 - 194/101)  RR: 17 (07-15-25 @ 14:25) (16 - 25)  SpO2: 98% (07-15-25 @ 14:25) (96% - 100%)  Wt(kg): --  Lungs clear  Heart S1S2  Abd soft NT ND  Extremities:   tr edema                                    10.1   6.95  )-----------( 174      ( 15 Jul 2025 10:35 )             29.5     07-15    134[L]  |  97  |  34[H]  ----------------------------<  123[H]  4.1   |  25  |  8.34[H]    Ca    8.3[L]      15 Jul 2025 10:35  Phos  4.0     07-14  Mg     2.7     07-14    TPro  5.4[L]  /  Alb  2.6[L]  /  TBili  0.3  /  DBili  x   /  AST  11[L]  /  ALT  11[L]  /  AlkPhos  45  07-14      LIVER FUNCTIONS - ( 14 Jul 2025 03:40 )  Alb: 2.6 g/dL / Pro: 5.4 gm/dL / ALK PHOS: 45 U/L / ALT: 11 U/L / AST: 11 U/L / GGT: x           Creatinine Trend: 8.34<--, 6.05<--, 4.41<--, 6.22<--, 6.33<--        Impression:  * HD dependent ESRD. Usual outpt schedule TTS  * Resp failure due to pulm edema and possibly aspiration. Extubated.   * Hypertensive urgency. Better.     Recommendations:  HD for today  UF as tolerated   Will follow course  Adjust medications      Yakov Tripathi MD

## 2025-07-15 NOTE — PHYSICAL THERAPY INITIAL EVALUATION ADULT - STRENGTHENING, PT EVAL
Pt will improve gross strength to a MMT grade of 4/5 to improve transfer, stair, and gait efficiency within x4 weeks.

## 2025-07-15 NOTE — PROGRESS NOTE ADULT - SUBJECTIVE AND OBJECTIVE BOX
Patient is a 59y old  Male who presents with a chief complaint of Intubated, Sepsis, PNA, HF (15 Jul 2025 08:15)    INTERVAL HPI/OVERNIGHT EVENTS: Patients seen and examined at bedside this morning. No acute events overnight. Pt reports no BM.    MEDICATIONS  (STANDING):  albuterol/ipratropium for Nebulization 3 milliLiter(s) Nebulizer every 6 hours  ARIPiprazole 30 milliGRAM(s) Oral daily  atorvastatin 40 milliGRAM(s) Oral at bedtime  bisacodyl Suppository 10 milliGRAM(s) Rectal once  carvedilol 25 milliGRAM(s) Oral every 12 hours  chlorhexidine 2% Cloths 1 Application(s) Topical <User Schedule>  fluticasone propionate/ salmeterol 100-50 MICROgram(s) Diskus 1 Dose(s) Inhalation two times a day  heparin   Injectable 5000 Unit(s) SubCutaneous every 8 hours  hydrALAZINE 100 milliGRAM(s) Oral every 8 hours  nicotine - 21 mG/24Hr(s) Patch 1 Patch Transdermal daily  NIFEdipine XL 30 milliGRAM(s) Oral daily  piperacillin/tazobactam IVPB.. 3.375 Gram(s) IV Intermittent every 12 hours  polyethylene glycol 3350 17 Gram(s) Oral daily  senna 2 Tablet(s) Oral at bedtime  tamsulosin 0.4 milliGRAM(s) Oral at bedtime    MEDICATIONS  (PRN):  benzocaine/menthol Lozenge 1 Lozenge Oral four times a day PRN Sore Throat    Allergies    No Known Allergies    Intolerances      REVIEW OF SYSTEMS:  All other systems reviewed and are negative    Vital Signs Last 24 Hrs  T(C): 36.6 (15 Jul 2025 14:25), Max: 36.7 (2025 23:47)  T(F): 97.9 (15 Jul 2025 14:25), Max: 98.1 (2025 23:47)  HR: 90 (15 Jul 2025 14:25) (76 - 101)  BP: 177/98 (15 Jul 2025 14:25) (156/83 - 194/101)  BP(mean): 110 (2025 17:49) (106 - 110)  RR: 17 (15 Jul 2025 14:25) (16 - 25)  SpO2: 98% (15 Jul 2025 14:25) (96% - 100%)    Parameters below as of 15 Jul 2025 14:25  Patient On (Oxygen Delivery Method): room air      Daily     Daily Weight in k.4 (15 Jul 2025 14:02)  I&O's Summary    2025 07:01  -  15 Jul 2025 07:00  --------------------------------------------------------  IN: 100 mL / OUT: 110 mL / NET: -10 mL    15 Jul 2025 07:01  -  15 Jul 2025 15:05  --------------------------------------------------------  IN: 0 mL / OUT: 3000 mL / NET: -3000 mL      CAPILLARY BLOOD GLUCOSE        PHYSICAL EXAM:  GENERAL: NAD  HEAD:  Atraumatic, Normocephalic  EYES: PERRLA, conjunctiva and sclera clear  ENMT: Moist mucous membranes  NECK: Supple, No JVD, Normal thyroid  NERVOUS SYSTEM:  Alert & Awake  CHEST/LUNG: Clear to percussion bilaterally;   HEART: Regular rate and rhythm;   ABDOMEN: Soft, Nontender, Nondistended; Bowel sounds present  EXTREMITIES:  no edema BL LE  SKIN: moist      Labs                          10.1   6.95  )-----------( 174      ( 15 Jul 2025 10:35 )             29.5     07-15    134[L]  |  97  |  34[H]  ----------------------------<  123[H]  4.1   |  25  |  8.34[H]    Ca    8.3[L]      15 Jul 2025 10:35  Phos  4.0     07-14  Mg     2.7     07-14    TPro  5.4[L]  /  Alb  2.6[L]  /  TBili  0.3  /  DBili  x   /  AST  11[L]  /  ALT  11[L]  /  AlkPhos  45  07-14          Urinalysis Basic - ( 15 Jul 2025 10:35 )    Color: x / Appearance: x / SG: x / pH: x  Gluc: 123 mg/dL / Ketone: x  / Bili: x / Urobili: x   Blood: x / Protein: x / Nitrite: x   Leuk Esterase: x / RBC: x / WBC x   Sq Epi: x / Non Sq Epi: x / Bacteria: x                      Radiology and Imaging reviewed.

## 2025-07-15 NOTE — PHYSICAL THERAPY INITIAL EVALUATION ADULT - PERTINENT HX OF CURRENT PROBLEM, REHAB EVAL
Per EMR: 59M w/ bipolar disease, HTN, CAD, HFmrEF, COPD - active smoker 1 PPM, ESRD on HD due to PKD. Presents w/ SOB and cough. In ED, pt hypoxemic and hypertensive and febrile. Placed on BiPAP but had episode of vomiting and possible aspiration requiring for intubation. Imaging reveals bibasilar consolidations. Pt admitted to icu for further mngmt. PT now extubated, downgraded and pulm consulted for follow up.

## 2025-07-15 NOTE — PROGRESS NOTE ADULT - ASSESSMENT
59M w/ bipolar disease, HTN, CAD, HFrEF, COPD - active smoker 1 PPM, ESRD on HD due to PKD. Presents w/ SOB and cough. In ED, pt hypoxemic and hypertensive and febrile. Placed on BiPAP but had episode of vomiting and possible aspiration requiring for intubation. Imaging reveals bibasilar consolidations. PT admitted to icu and started on zosyn for empiric aspiration coverage. Pt S/P HD. Extubated. Pulm consulted for follow up.    acute resp failure w/hypoxia intubated 7/12 for HRF 2/2 flash pulm edema.   acute pulmonary edema  copd  Acute on chronic syst CHFE  Gram-neg pna  ESRD on HD.   Cont dialysis per renal  HTN  - cont zosyn and zithro for 5 day course for aspiration pna. cx ngtd  - cont to uptitrate antiHTNs for BP  - cont duonebs for now for copd and active smoking with benefit of airway clearance.     (7/15) Clinically improving. Ambulating without assistance  HD today. Plan for discharge with resumption of [facility] services tomorrow if clinically improving    Constipation  dulcolax suppository

## 2025-07-15 NOTE — PROGRESS NOTE ADULT - SUBJECTIVE AND OBJECTIVE BOX
CHIEF COMPLAINT:    Interval Events:    REVIEW OF SYSTEMS:  Constitutional: [ ] negative [ ] fevers [ ] chills [ ] weight loss [ ] weight gain  HEENT: [ ] negative [ ] dry eyes [ ] eye irritation [ ] postnasal drip [ ] nasal congestion  CV: [ ] negative  [ ] chest pain [ ] orthopnea [ ] palpitations [ ] murmur  Resp: [ ] negative [ ] cough [ ] shortness of breath [ ] dyspnea [ ] wheezing [ ] sputum [ ] hemoptysis  GI: [ ] negative [ ] nausea [ ] vomiting [ ] diarrhea [ ] constipation [ ] abd pain [ ] dysphagia   : [ ] negative [ ] dysuria [ ] nocturia [ ] hematuria [ ] increased urinary frequency  Musculoskeletal: [ ] negative [ ] back pain [ ] myalgias [ ] arthralgias [ ] fracture  Skin: [ ] negative [ ] rash [ ] itch  Neurological: [ ] negative [ ] headache [ ] dizziness [ ] syncope [ ] weakness [ ] numbness  Psychiatric: [ ] negative [ ] anxiety [ ] depression  Endocrine: [ ] negative [ ] diabetes [ ] thyroid problem  Hematologic/Lymphatic: [ ] negative [ ] anemia [ ] bleeding problem  Allergic/Immunologic: [ ] negative [ ] itchy eyes [ ] nasal discharge [ ] hives [ ] angioedema  [ ] All other systems negative  [ ] Unable to assess ROS because ________    OBJECTIVE:  ICU Vital Signs Last 24 Hrs  T(C): 36.4 (15 Jul 2025 05:49), Max: 36.7 (14 Jul 2025 23:47)  T(F): 97.6 (15 Jul 2025 05:49), Max: 98.1 (14 Jul 2025 23:47)  HR: 88 (15 Jul 2025 05:51) (79 - 101)  BP: 167/85 (15 Jul 2025 05:49) (156/83 - 194/101)  BP(mean): 110 (14 Jul 2025 17:49) (99 - 116)  ABP: --  ABP(mean): --  RR: 20 (15 Jul 2025 05:49) (20 - 25)  SpO2: 100% (15 Jul 2025 05:51) (96% - 100%)    O2 Parameters below as of 15 Jul 2025 05:51  Patient On (Oxygen Delivery Method): room air              07-14 @ 07:01  -  07-15 @ 07:00  --------------------------------------------------------  IN: 100 mL / OUT: 110 mL / NET: -10 mL      CAPILLARY BLOOD GLUCOSE          PHYSICAL EXAM:  GENERAL: NAD, lying in bed comfortably  HEAD:  Atraumatic, normocephalic  EYES: EOMI, PERRLA, conjunctiva and sclera clear  NECK: Supple, trachea midline, no JVD  HEART: Regular rate and rhythm, no murmurs, rubs, or gallops  LUNGS: Unlabored respirations.  Clear to auscultation bilaterally, no crackles, wheezing, or rhonchi  ABDOMEN: Soft, nontender, nondistended, +BS  EXTREMITIES: 2+ peripheral pulses bilaterally. No clubbing, cyanosis, or edema  NERVOUS SYSTEM:  A&Ox3, moving all extremities, no focal deficits   SKIN: No rashes or lesions    HOSPITAL MEDICATIONS:  heparin   Injectable 5000 Unit(s) SubCutaneous every 8 hours    piperacillin/tazobactam IVPB.. 3.375 Gram(s) IV Intermittent every 12 hours    carvedilol 25 milliGRAM(s) Oral every 12 hours  hydrALAZINE 100 milliGRAM(s) Oral every 8 hours  NIFEdipine XL 30 milliGRAM(s) Oral daily    atorvastatin 40 milliGRAM(s) Oral at bedtime    albuterol/ipratropium for Nebulization 3 milliLiter(s) Nebulizer every 6 hours  fluticasone propionate/ salmeterol 100-50 MICROgram(s) Diskus 1 Dose(s) Inhalation two times a day    ARIPiprazole 30 milliGRAM(s) Oral daily    polyethylene glycol 3350 17 Gram(s) Oral daily  senna 2 Tablet(s) Oral at bedtime      tamsulosin 0.4 milliGRAM(s) Oral at bedtime        benzocaine/menthol Lozenge 1 Lozenge Oral four times a day PRN  chlorhexidine 2% Cloths 1 Application(s) Topical <User Schedule>    nicotine - 21 mG/24Hr(s) Patch 1 Patch Transdermal daily      LABS:                        9.5    5.49  )-----------( 163      ( 14 Jul 2025 03:40 )             27.8     Hgb Trend: 9.5<--, 9.3<--, 14.1<--  07-14    135  |  98  |  27[H]  ----------------------------<  80  3.8   |  28  |  6.05[H]    Ca    8.4[L]      14 Jul 2025 03:40  Phos  4.0     07-14  Mg     2.7     07-14    TPro  5.4[L]  /  Alb  2.6[L]  /  TBili  0.3  /  DBili  x   /  AST  11[L]  /  ALT  11[L]  /  AlkPhos  45  07-14    Creatinine Trend: 6.05<--, 4.41<--, 6.22<--, 6.33<--    Urinalysis Basic - ( 14 Jul 2025 03:40 )    Color: x / Appearance: x / SG: x / pH: x  Gluc: 80 mg/dL / Ketone: x  / Bili: x / Urobili: x   Blood: x / Protein: x / Nitrite: x   Leuk Esterase: x / RBC: x / WBC x   Sq Epi: x / Non Sq Epi: x / Bacteria: x                 Interval Events:  s/p HD today, on RA, no complaints    REVIEW OF SYSTEMS:  Constitutional: [ ] negative [ ] fevers [ ] chills [x ] weight loss [ ] weight gain  HEENT: [x ] negative [ ] dry eyes [ ] eye irritation [ ] postnasal drip [ ] nasal congestion  CV: [ x] negative  [ ] chest pain [ ] orthopnea [ ] palpitations [ ] murmur  Resp: [x ] negative [ ] cough [ ] shortness of breath [ ] dyspnea [ ] wheezing [ ] sputum [ ] hemoptysis  GI: [ ] negative [ ] nausea [ ] vomiting [ ] diarrhea [ ] constipation [ ] abd pain [ ] dysphagia   : [ ] negative [ ] dysuria [ ] nocturia [ ] hematuria [ ] increased urinary frequency  Musculoskeletal: [ ] negative [ ] back pain [ ] myalgias [ ] arthralgias [ ] fracture  Skin: [ ] negative [ ] rash [ ] itch  Neurological: [ ] negative [ ] headache [ ] dizziness [ ] syncope [ ] weakness [ ] numbness  Psychiatric: [ ] negative [ ] anxiety [ ] depression  Endocrine: [ ] negative [ ] diabetes [ ] thyroid problem  Hematologic/Lymphatic: [ ] negative [ ] anemia [ ] bleeding problem  Allergic/Immunologic: [ ] negative [ ] itchy eyes [ ] nasal discharge [ ] hives [ ] angioedema  [ x] All other systems negative      OBJECTIVE:  Vital Signs Last 24 Hrs  T(C): 36.4 (15 Jul 2025 05:49), Max: 36.7 (14 Jul 2025 23:47)  T(F): 97.6 (15 Jul 2025 05:49), Max: 98.1 (14 Jul 2025 23:47)  HR: 88 (15 Jul 2025 05:51) (79 - 101)  BP: 167/85 (15 Jul 2025 05:49) (156/83 - 194/101)  BP(mean): 110 (14 Jul 2025 17:49) (99 - 116)  RR: 20 (15 Jul 2025 05:49) (20 - 25)  SpO2: 100% (15 Jul 2025 05:51) (96% - 100%)    O2 Parameters below as of 15 Jul 2025 05:51  Patient On (Oxygen Delivery Method): room air              07-14 @ 07:01  -  07-15 @ 07:00  --------------------------------------------------------  IN: 100 mL / OUT: 110 mL / NET: -10 mL            PHYSICAL EXAM:  GENERAL: NAD, lying in bed comfortably  HEAD:  Atraumatic, normocephalic  EYES: EOMI, PERRLA, conjunctiva and sclera clear  NECK: Supple, trachea midline, no JVD  HEART: Regular rate and rhythm, no murmurs, rubs, or gallops  LUNGS: Unlabored respirations.  Clear to auscultation bilaterally, no crackles, wheezing, or rhonchi  ABDOMEN: Soft, nontender, nondistended, +BS  EXTREMITIES: 2+ peripheral pulses bilaterally. No clubbing, cyanosis, or edema, R arm AVG  NERVOUS SYSTEM:  A&Ox3, moving all extremities, no focal deficits   SKIN: No rashes or lesions      HOSPITAL MEDICATIONS:  heparin   Injectable 5000 Unit(s) SubCutaneous every 8 hours    piperacillin/tazobactam IVPB.. 3.375 Gram(s) IV Intermittent every 12 hours    carvedilol 25 milliGRAM(s) Oral every 12 hours  hydrALAZINE 100 milliGRAM(s) Oral every 8 hours  NIFEdipine XL 30 milliGRAM(s) Oral daily    atorvastatin 40 milliGRAM(s) Oral at bedtime    albuterol/ipratropium for Nebulization 3 milliLiter(s) Nebulizer every 6 hours  fluticasone propionate/ salmeterol 100-50 MICROgram(s) Diskus 1 Dose(s) Inhalation two times a day    ARIPiprazole 30 milliGRAM(s) Oral daily    polyethylene glycol 3350 17 Gram(s) Oral daily  senna 2 Tablet(s) Oral at bedtime      tamsulosin 0.4 milliGRAM(s) Oral at bedtime        benzocaine/menthol Lozenge 1 Lozenge Oral four times a day PRN  chlorhexidine 2% Cloths 1 Application(s) Topical <User Schedule>    nicotine - 21 mG/24Hr(s) Patch 1 Patch Transdermal daily      LABS:                        9.5    5.49  )-----------( 163      ( 14 Jul 2025 03:40 )             27.8     Hgb Trend: 9.5<--, 9.3<--, 14.1<--  07-14    135  |  98  |  27[H]  ----------------------------<  80  3.8   |  28  |  6.05[H]    Ca    8.4[L]      14 Jul 2025 03:40  Phos  4.0     07-14  Mg     2.7     07-14    TPro  5.4[L]  /  Alb  2.6[L]  /  TBili  0.3  /  DBili  x   /  AST  11[L]  /  ALT  11[L]  /  AlkPhos  45  07-14    Creatinine Trend: 6.05<--, 4.41<--, 6.22<--, 6.33<--    Pro-Brain Natriuretic Peptide (07.13.25 @ 04:40)    Pro-Brain Natriuretic Peptide: 50268 pg/mL      MICRO  Culture - Sputum . (07.12.25 @ 10:00)   Specimen Source: ET Tube ET Tube   Culture Results: Commensal jo ann consistent with body site        Culture - Blood (07.12.25 @ 02:15)    Specimen Source: Blood Blood-Peripheral   Culture Results: No growth at 72 Hours        S. pneumoniae; L. pneumophila Ag, Urine (07.12.25 @ 13:00)    Streptococcus pneumoniae antigen: Negative   Legionella pneumophila antigen: Negative        RADIOLOGY  < from: CT Chest No Cont (07.12.25 @ 04:45) >  CHEST:  LUNGS AND LARGE AIRWAYS: Tracheostomy tube. Patent central airways.   Prominent bibasilar airspace opacities and to a lesser extent in the   bilateral upper lobes, left greater than right. Findings may represent   infection and/or pulmonary edema. Correlate clinically. Recommend   short-term follow-up noncontrast chest CT to assess for resolution.  PLEURA: Small bilateral effusions.  VESSELS: Within normal limits.  HEART: Heart size is normal. No pericardial effusion. Cardiac stent.  MEDIASTINUM AND ANILA: No lymphadenopathy.  CHEST WALL AND LOWER NECK: Within normal limits.    ABDOMEN AND PELVIS:  LIVER: Within normal limits.  BILE DUCTS: Normal caliber.  GALLBLADDER: Within normal limits.  SPLEEN: Within normal limits.  PANCREAS: Within normal limits.  ADRENALS: Within normal limits.  KIDNEYS/URETERS: Bilateral markedly enlarged polycystic kidneys. No   hydronephrosis. Small calcifications in several dense cysts likely   hemorrhagic or proteinaceous.    BLADDER: The urinary bladder is collapsed around a Guthrie catheter   limiting evaluation.  REPRODUCTIVE ORGANS: Prostate within normal limits.    BOWEL: No bowel obstruction. Appendix is normal.  PERITONEUM/RETROPERITONEUM: Within normal limits.  VESSELS: Atherosclerotic changes.  LYMPH NODES: No lymphadenopathy.  ABDOMINAL WALL: Within normal limits.  BONES: Within normal limits.    IMPRESSION:    Limited noncontrast examination.    Prominent bibasilar airspace opacities and to a lesser extent in the   bilateral upper lobes, left greater than right. Findings may represent   infection and/or pulmonary edema. Correlate clinically. Recommend   short-term follow-up noncontrast chest CT to assess for resolution.    Enlarged bilateral polycystic kidneys.    ----------------------------  < from: Xray Chest 1 View- PORTABLE-Urgent (Xray Chest 1 View- PORTABLE-Urgent .) (07.12.25 @ 07:06) >  ET tube in satisfactory position.  Enteric tube tip in stomach.  Heart is top normal in size.  Mild improvement in bilateral lung opacities due to improving congestion   and/or pneumonia.  No pleural effusion or pneumothorax.    IMPRESSION:    Mild improvement in pulmonary congestion and/or pneumonia.  ------------------------  < from: TTE Echo Complete w/o Contrast w/ Doppler (07.12.25 @ 10:41) >    CONCLUSIONS:     1. Technically difficult image quality.   2. Left ventricular cavity is normal in size. Left ventricular systolic   function is low-normal with an ejectionfraction of 50 % by Diez's   method of disks.   3. There is mild (grade 1) left ventricular diastolic dysfunction.   4. Aortic root at the sinuses of Valsalva is normal in size, measuring   3.20 cm (indexed 1.81 cm/m²).   5. Mild mitral regurgitation.   6. Pulmonic valve was not well visualized.   7. Mild tricuspid regurgitation.   8. Trileaflet aortic valve with normal systolic excursion. No aortic   valve stenosis.   9. No evidence of aortic regurgitation.  10. Left pleural effusion noted.  11. Small pericardial effusion with no echocardiographic evidence of   tamponade physiology.

## 2025-07-15 NOTE — PROGRESS NOTE ADULT - ASSESSMENT
59M w/ bipolar disease, HTN, CAD, HFmrEF, COPD - active smoker 1 PPM, ESRD on HD due to PKD. Presents w/ SOB and cough. In ED, pt hypoxemic and hypertensive and febrile. Placed on BiPAP but had episode of vomiting and possible aspiration requiring for intubation. Imaging reveals bibasilar consolidations. Pt admitted to icu for further mngmt. PT now extubated, downgraded and pulm consulted for follow up.    - intubated 7/12 for HRF 2/2 flash pulm edema. ESRD on HD. S/p 2.5L removed with HD yest and extubated yest to RA.    recs:   - Cont dialysis per renal  - cont zosyn and zithro for 5 day course for aspiration pna. cx ngtd  - cont to uptitrate antiHTNs for BP  - cont duonebs for now for copd and active smoking with benefit of airway clearance.   - on RA.   - rest of care per primary team     Discussed with Dr. Bonilla  59M w/ bipolar disease, HTN, CAD, HFmrEF, COPD - active smoker 1 PPM, ESRD on HD due to PKD. Presents w/ SOB and cough. In ED, pt hypoxemic and hypertensive and febrile. Placed on BiPAP but had episode of vomiting and possible aspiration requiring for intubation. Imaging reveals bibasilar consolidations. Pt admitted to icu for further mngmt. PT now extubated, downgraded and pulm consulted for follow up.    - intubated 7/12 for HRF 2/2 flash pulm edema. ESRD on HD. S/p 2.5L removed with HD yest and extubated yest to RA.    recs:  - continue advair. discharge on symbicort and albuterol PRN    - Cont dialysis per renal  - cont zosyn and zithro for 5 day course for aspiration pna. cx ngtd  - cont to uptitrate antiHTNs for BP  - on RA.   - rest of care per primary team   - will need pulmonary follow up upon discharge for PFTs. Spoke with daughter/HCP via phone gets all his care at [facility] and actively searching for pulmonologist.     Discussed with Dr. Bonilla  59M w/ bipolar disease, HTN, CAD, HFmrEF, COPD - active smoker 1 PPM, ESRD on HD due to PKD. Presents w/ SOB and cough. In ED, pt hypoxemic and hypertensive and febrile. Placed on BiPAP but had episode of vomiting and possible aspiration requiring for intubation. Imaging reveals bibasilar consolidations. Pt admitted to icu for further mngmt. PT now extubated, downgraded and pulm consulted for follow up.    - intubated 7/12 for HRF 2/2 flash pulm edema. ESRD on HD. S/p 2.5L removed with HD yest and extubated yest to RA.    recs:  - continue advair. discharge on symbicort and albuterol PRN    - Cont dialysis per renal  - cont zosyn for 5 day course for aspiration pna. cx ngtd  - cont to uptitrate antiHTNs for BP  - on RA.   - rest of care per primary team   - will need pulmonary follow up upon discharge for PFTs. Spoke with daughter/HCP via phone gets all his care at [facility] and actively searching for pulmonologist.     Discussed with Dr. Bonilla

## 2025-07-16 ENCOUNTER — TRANSCRIPTION ENCOUNTER (OUTPATIENT)
Age: 60
End: 2025-07-16

## 2025-07-16 VITALS
HEART RATE: 78 BPM | RESPIRATION RATE: 18 BRPM | OXYGEN SATURATION: 98 % | TEMPERATURE: 98 F | SYSTOLIC BLOOD PRESSURE: 147 MMHG | DIASTOLIC BLOOD PRESSURE: 81 MMHG

## 2025-07-16 LAB
ANION GAP SERPL CALC-SCNC: 10 MMOL/L — SIGNIFICANT CHANGE UP (ref 5–17)
BUN SERPL-MCNC: 19 MG/DL — SIGNIFICANT CHANGE UP (ref 7–23)
CALCIUM SERPL-MCNC: 8.3 MG/DL — LOW (ref 8.5–10.1)
CHLORIDE SERPL-SCNC: 99 MMOL/L — SIGNIFICANT CHANGE UP (ref 96–108)
CO2 SERPL-SCNC: 26 MMOL/L — SIGNIFICANT CHANGE UP (ref 22–31)
CREAT SERPL-MCNC: 6.33 MG/DL — HIGH (ref 0.5–1.3)
EGFR: 9 ML/MIN/1.73M2 — LOW
EGFR: 9 ML/MIN/1.73M2 — LOW
GLUCOSE SERPL-MCNC: 89 MG/DL — SIGNIFICANT CHANGE UP (ref 70–99)
HCT VFR BLD CALC: 31 % — LOW (ref 39–50)
HGB BLD-MCNC: 10.5 G/DL — LOW (ref 13–17)
M PNEUMO IGM SER-ACNC: 0.17 INDEX — SIGNIFICANT CHANGE UP (ref 0–0.9)
MCHC RBC-ENTMCNC: 29.4 PG — SIGNIFICANT CHANGE UP (ref 27–34)
MCHC RBC-ENTMCNC: 33.9 G/DL — SIGNIFICANT CHANGE UP (ref 32–36)
MCV RBC AUTO: 86.8 FL — SIGNIFICANT CHANGE UP (ref 80–100)
MYCOPLASMA AG SPEC QL: NEGATIVE — SIGNIFICANT CHANGE UP
NRBC BLD AUTO-RTO: 0 /100 WBCS — SIGNIFICANT CHANGE UP (ref 0–0)
PLATELET # BLD AUTO: 189 K/UL — SIGNIFICANT CHANGE UP (ref 150–400)
POTASSIUM SERPL-MCNC: 4 MMOL/L — SIGNIFICANT CHANGE UP (ref 3.5–5.3)
POTASSIUM SERPL-SCNC: 4 MMOL/L — SIGNIFICANT CHANGE UP (ref 3.5–5.3)
RBC # BLD: 3.57 M/UL — LOW (ref 4.2–5.8)
RBC # FLD: 15 % — HIGH (ref 10.3–14.5)
SODIUM SERPL-SCNC: 135 MMOL/L — SIGNIFICANT CHANGE UP (ref 135–145)
WBC # BLD: 4.36 K/UL — SIGNIFICANT CHANGE UP (ref 3.8–10.5)
WBC # FLD AUTO: 4.36 K/UL — SIGNIFICANT CHANGE UP (ref 3.8–10.5)

## 2025-07-16 PROCEDURE — 99232 SBSQ HOSP IP/OBS MODERATE 35: CPT

## 2025-07-16 PROCEDURE — 99239 HOSP IP/OBS DSCHRG MGMT >30: CPT

## 2025-07-16 RX ORDER — CARVEDILOL 3.12 MG/1
1 TABLET, FILM COATED ORAL
Refills: 0 | DISCHARGE

## 2025-07-16 RX ORDER — NIFEDIPINE 30 MG
1 TABLET, EXTENDED RELEASE 24 HR ORAL
Qty: 30 | Refills: 0
Start: 2025-07-16 | End: 2025-08-14

## 2025-07-16 RX ORDER — CARVEDILOL 3.12 MG/1
1 TABLET, FILM COATED ORAL
Qty: 60 | Refills: 0
Start: 2025-07-16 | End: 2025-08-14

## 2025-07-16 RX ORDER — SENNA 187 MG
2 TABLET ORAL
Qty: 60 | Refills: 0
Start: 2025-07-16 | End: 2025-08-14

## 2025-07-16 RX ORDER — NICOTINE POLACRILEX 4 MG/1
1 GUM, CHEWING ORAL
Qty: 30 | Refills: 0
Start: 2025-07-16 | End: 2025-08-14

## 2025-07-16 RX ORDER — POLYETHYLENE GLYCOL 3350 17 G/17G
17 POWDER, FOR SOLUTION ORAL
Qty: 510 | Refills: 0
Start: 2025-07-16 | End: 2025-08-14

## 2025-07-16 RX ORDER — NIFEDIPINE 30 MG
1 TABLET, EXTENDED RELEASE 24 HR ORAL
Refills: 0 | DISCHARGE

## 2025-07-16 RX ADMIN — HEPARIN SODIUM 5000 UNIT(S): 1000 INJECTION INTRAVENOUS; SUBCUTANEOUS at 05:56

## 2025-07-16 RX ADMIN — HEPARIN SODIUM 5000 UNIT(S): 1000 INJECTION INTRAVENOUS; SUBCUTANEOUS at 13:43

## 2025-07-16 RX ADMIN — NICOTINE POLACRILEX 1 PATCH: 4 GUM, CHEWING ORAL at 14:42

## 2025-07-16 RX ADMIN — NICOTINE POLACRILEX 1 PATCH: 4 GUM, CHEWING ORAL at 07:31

## 2025-07-16 RX ADMIN — Medication 1 DOSE(S): at 06:13

## 2025-07-16 RX ADMIN — Medication 100 MILLIGRAM(S): at 05:55

## 2025-07-16 RX ADMIN — Medication 30 MILLIGRAM(S): at 05:55

## 2025-07-16 RX ADMIN — CARVEDILOL 25 MILLIGRAM(S): 3.12 TABLET, FILM COATED ORAL at 05:55

## 2025-07-16 RX ADMIN — Medication 25 GRAM(S): at 10:41

## 2025-07-16 RX ADMIN — Medication 100 MILLIGRAM(S): at 13:43

## 2025-07-16 RX ADMIN — Medication 1 APPLICATION(S): at 06:06

## 2025-07-16 NOTE — DISCHARGE NOTE PROVIDER - NSDCCPCAREPLAN_GEN_ALL_CORE_FT
PRINCIPAL DISCHARGE DIAGNOSIS  Diagnosis: Sepsis with acute hypoxic respiratory failure  Assessment and Plan of Treatment: 59M w/ bipolar disease, HTN, CAD, HFrEF, COPD - active smoker 1 PPM, ESRD on HD due to PKD. Presents w/ SOB and cough. In ED, pt hypoxemic and hypertensive and febrile. Placed on BiPAP but had episode of vomiting and possible aspiration requiring for intubation. Imaging reveals bibasilar consolidations. PT admitted to icu and started on zosyn for empiric aspiration coverage. Pt S/P HD. Extubated. Pulm consulted for follow up.  Acute resp failure w/hypoxia intubated 7/12 for HRF 2/2 flash pulm edema.   Acute pulmonary edema  COPD  Acute on chronic diastolic CHF  Sepsis POA , 2/2 Gram-neg PNA  ESRD on HD.   Cont dialysis per renal  HTN  - Completed zosyn for 5 day course for aspiration PNA  - cont to uptitrate antiHTNs for BP  - cont duonebs PRN for now for copd and active smoking with benefit of airway clearance.   Constipation  dulcolax suppository , Had BM

## 2025-07-16 NOTE — PROGRESS NOTE ADULT - NS ATTEND AMEND GEN_ALL_CORE FT
pt seen and examined at bedside with NP      no SOB  on RA  s/p HD yesterday with 3L fluid removal        59M active smoker 1PPD x 40 years PMH HTN, HLD, CAD s/p PCI, HFmrEF, COPD (not oxygen dependent), polycystic kidney disease, ESRD on HD (T-Th-Sat), bipolar disease presents for SOB and cough. Hypoxic to the 60s and hypertensive 200s/120s with fever on presentation. Placed on BiPAP but had episode of vomiting and possible aspiration requiring intubation. Imaging reveals bibasilar consolidations. Admitted to ICU for further management. proBNP 35418. Initial arterial blood gas 7.26/61/69/35/93%. No leukocytosis. s/p HD with 2.5L fluid removal on 7/12. Extubated 7/13 to RA and downgraded to medical service. Pulmonary consulted for follow up.    DX: acute hypoxic and hypercarbic respiratory failure, respiratory failure requiring intubation, acute on chronic HFmrEF, pulmonary congestion, aspiration PNA, hypertensive urgency, COPD, active smoker, ESRD on HD, polycystic kidney disease    - weaned and extubated 7/13  - on RA oxygenating well  - on zosyn (day 5 of 5) for aspiration PNA  - sputum cx with commensal jo ann  - strep/legionella negative  - on symbicort and prn albuterol outpatient but non compliant  - was prescribed symbicort and prn albuterol for concern of underling COPD by PCP  - no official COPD diagnosis: will need outpatient pulmonary follow up and PFTs  - cont with hospital interchange for Symbicort with advair. he can resume home symbicort and albuterol on discharge  - change duonebs to prn frequency  - nicotine patch for smoking hx  - incentive spirometer  - BP management per medical team  - OOB to chair  - remainder of care per primary team  - d/c planning for home today  - spoke with daughter yesterday by phone, states pt follows with [facility] and gets all of his care from there and are in the process of getting an [facility] affiliated pulmonologist  - no pulmonary objections for d/c
pt seen and examined at bedside with PA      sputum cx with commensal jo ann  remains on room air oxygenating well  no SOB  flu/covid/rsv negative  strep/legionella neg  s/p HD today with 3L fluid removal    spoke to daughter over the phone today who states pt has had poor appetite with HD and recently had 5 teeth pulled and has been taking mostly liquid intake (tea and coffee) and "smoking his cigarettes"  recently got Symbicort and prn albuterol prescription from PCP for SOB with concerns for underlying COPD - daughter states pt is non compliant with bronchodilators     he does not follow with a pulmonologist      59M active smoker 1PPD x 40 years PMH HTN, HLD, CAD s/p PCI, HFmrEF, COPD (not oxygen dependent), polycystic kidney disease, ESRD on HD (T-Th-Sat), bipolar disease presents for SOB and cough. Hypoxic to the 60s and hypertensive 200s/120s with fever on presentation. Placed on BiPAP but had episode of vomiting and possible aspiration requiring intubation. Imaging reveals bibasilar consolidations. Admitted to ICU for further management. proBNP 50530. Initial arterial blood gas 7.26/61/69/35/93%. No leukocytosis. s/p HD with 2.5L fluid removal on 7/12. Extubated 7/13 to RA and downgraded to medical service. Pulmonary consulted for follow up.    DX: acute hypoxic and hypercarbic respiratory failure, respiratory failure requiring intubation, acute on chronic HFmrEF, pulmonary congestion, aspiration PNA, hypertensive urgency, COPD, active smoker, ESRD on HD, polycystic kidney disease    - weaned and extubated 7/13  - doing well on RA  - goal to maintain o2 sat > 90%  - check RA sat with ambulation  - cont fluid removal with HD for underlying CHF  - HD per nephrology: s/p 3L fluid removal today  - on zosyn (day 4) for aspiration PNA  - 5 day course of antibx should be sufficient to treat PNA  - sputum cx with commensal jo ann  - strep/legionella negative  - on symbicort and prn albuterol outpatient but non compliant  - no official COPD diagnosis: will need outpatient pulmonary follow up and PFTs  - cont with hospital interchange for Symbicort with advair. he can resume home symbicort and albuterol on discharge  - currently on duonebs around the clock, can change to prn frequency  - nicotine patch for smoking hx  - incentive spirometer  - BP management per medical team  - OOB to chair  - remainder of care per primary team  - physical therapy, OOB to chair  - ? dc planning  - daughter states pt follows with [facility] and gets all of his care from there and are in the process of getting an [facility] affiliated pulmonologist

## 2025-07-16 NOTE — PROGRESS NOTE ADULT - REASON FOR ADMISSION
Intubated, Sepsis, PNA, HF

## 2025-07-16 NOTE — DISCHARGE NOTE PROVIDER - ATTENDING DISCHARGE PHYSICAL EXAMINATION:
GENERAL: NAD  HEAD:  Atraumatic, Normocephalic  EYES: PERRLA, conjunctiva and sclera clear  ENMT: Moist mucous membranes  NECK: Supple, No JVD, Normal thyroid  NERVOUS SYSTEM:  Alert & Awake  CHEST/LUNG: Diminished   HEART: Regular rate and rhythm  ABDOMEN: Soft, Nontender, Nondistended; Bowel sounds present  EXTREMITIES:  no edema BL LE  SKIN: No rash

## 2025-07-16 NOTE — DISCHARGE NOTE NURSING/CASE MANAGEMENT/SOCIAL WORK - NSDCPEFALRISK_GEN_ALL_CORE
For information on Fall & Injury Prevention, visit: [email or web address] OR  [email or web address] OR  https://www.cdc.gov/steadi/patient.html

## 2025-07-16 NOTE — DISCHARGE NOTE NURSING/CASE MANAGEMENT/SOCIAL WORK - PATIENT PORTAL LINK FT
You can access the FollowMyHealth Patient Portal offered by [facility] by registering at the following website: [email or web address]. By joining [employer]’s FollowMyHealth portal, you will also be able to view your health information using other applications (apps) compatible with our system.

## 2025-07-16 NOTE — PROGRESS NOTE ADULT - TIME BILLING
Patient encounter, exam F-2-F, medical decision making , chart review and documentation
review of chart, records, blood work, vitals, medications, imaging, direct patient care.
review of chart, records, blood work, vitals, medications, imaging, direct patient care.

## 2025-07-16 NOTE — PROGRESS NOTE ADULT - SUBJECTIVE AND OBJECTIVE BOX
INTERVAL HPI/OVERNIGHT EVENTS: No acute events   SUBJECTIVE: Patient seen and examined at bedside.   ROS: All negative except as listed above.    VITAL SIGNS:  Vital Signs Last 24 Hrs  T(C): 36.9 (16 Jul 2025 06:01), Max: 36.9 (16 Jul 2025 06:01)  T(F): 98.4 (16 Jul 2025 06:01), Max: 98.4 (16 Jul 2025 06:01)  HR: 86 (16 Jul 2025 06:01) (76 - 90)  BP: 159/79 (16 Jul 2025 06:01) (154/88 - 177/98)  BP(mean): --  ABP: --  ABP(mean): --  RR: 18 (16 Jul 2025 06:01) (16 - 20)  SpO2: 94% (16 Jul 2025 06:01) (94% - 99%)    O2 Parameters below as of 15 Jul 2025 18:11  Patient On (Oxygen Delivery Method): room air      Plateau pressure:   P/F ratio:     07-15 @ 07:01  -  07-16 @ 07:00  --------------------------------------------------------  IN: 0 mL / OUT: 3000 mL / NET: -3000 mL      CAPILLARY BLOOD GLUCOSE          ECG: reviewed.    PHYSICAL EXAM:  GENERAL: NAD, lying in bed comfortably  HEAD:  Atraumatic, normocephalic  EYES: EOMI, PERRLA, conjunctiva and sclera clear  NECK: Supple, trachea midline, no JVD  HEART: Regular rate and rhythm, no murmurs, rubs, or gallops  LUNGS: Unlabored respirations.  Clear to auscultation bilaterally, no crackles, wheezing, or rhonchi  ABDOMEN: Soft, nontender, nondistended, +BS  EXTREMITIES: 2+ peripheral pulses bilaterally. No clubbing, cyanosis, or edema, R arm AVG  NERVOUS SYSTEM:  A&Ox3, moving all extremities, no focal deficits   SKIN: No rashes or lesions    MEDICATIONS:  MEDICATIONS  (STANDING):  ARIPiprazole 30 milliGRAM(s) Oral daily  atorvastatin 40 milliGRAM(s) Oral at bedtime  carvedilol 25 milliGRAM(s) Oral every 12 hours  chlorhexidine 2% Cloths 1 Application(s) Topical <User Schedule>  fluticasone propionate/ salmeterol 100-50 MICROgram(s) Diskus 1 Dose(s) Inhalation two times a day  heparin   Injectable 5000 Unit(s) SubCutaneous every 8 hours  hydrALAZINE 100 milliGRAM(s) Oral every 8 hours  nicotine - 21 mG/24Hr(s) Patch 1 Patch Transdermal daily  NIFEdipine XL 30 milliGRAM(s) Oral daily  piperacillin/tazobactam IVPB.. 3.375 Gram(s) IV Intermittent every 12 hours  polyethylene glycol 3350 17 Gram(s) Oral daily  senna 2 Tablet(s) Oral at bedtime  tamsulosin 0.4 milliGRAM(s) Oral at bedtime    MEDICATIONS  (PRN):  benzocaine/menthol Lozenge 1 Lozenge Oral four times a day PRN Sore Throat      ALLERGIES:  Allergies    No Known Allergies    Intolerances        LABS:                        10.5   4.36  )-----------( 189      ( 16 Jul 2025 06:06 )             31.0     07-16    135  |  99  |  19  ----------------------------<  89  4.0   |  26  |  6.33[H]    Ca    8.3[L]      16 Jul 2025 06:06        Urinalysis Basic - ( 16 Jul 2025 06:06 )    Color: x / Appearance: x / SG: x / pH: x  Gluc: 89 mg/dL / Ketone: x  / Bili: x / Urobili: x   Blood: x / Protein: x / Nitrite: x   Leuk Esterase: x / RBC: x / WBC x   Sq Epi: x / Non Sq Epi: x / Bacteria: x      ABG:      vBG:    Micro:    Culture - Blood (collected 07-12-25 @ 02:17)  Source: Blood Blood-Peripheral  Preliminary Report (07-15-25 @ 14:01):    No growth at 72 Hours    Culture - Blood (collected 07-12-25 @ 02:15)  Source: Blood Blood-Peripheral  Preliminary Report (07-15-25 @ 14:01):    No growth at 72 Hours        Culture - Sputum (collected 07-12-25 @ 10:00)  Source: ET Tube ET Tube  Gram Stain (07-13-25 @ 21:06):    Few Squamous epithelial cells per low power field    Few polymorphonuclear leukocytes per low power field    Few Gram positive cocci in pairs per oil power field  Final Report (07-13-25 @ 21:06):    Commensal jo ann consistent with body site        RADIOLOGY & ADDITIONAL TESTS: Reviewed. INTERVAL HPI/OVERNIGHT EVENTS: No acute events   SUBJECTIVE: Patient seen and examined at bedside.   ROS: All negative except as listed above.    VITAL SIGNS:  Vital Signs Last 24 Hrs  T(C): 36.9 (16 Jul 2025 06:01), Max: 36.9 (16 Jul 2025 06:01)  T(F): 98.4 (16 Jul 2025 06:01), Max: 98.4 (16 Jul 2025 06:01)  HR: 86 (16 Jul 2025 06:01) (76 - 90)  BP: 159/79 (16 Jul 2025 06:01) (154/88 - 177/98)  RR: 18 (16 Jul 2025 06:01) (16 - 20)  SpO2: 94% (16 Jul 2025 06:01) (94% - 99%)    O2 Parameters below as of 15 Jul 2025 18:11  Patient On (Oxygen Delivery Method): room air          07-15 @ 07:01  -  07-16 @ 07:00  --------------------------------------------------------  IN: 0 mL / OUT: 3000 mL / NET: -3000 mL          PHYSICAL EXAM:  GENERAL: NAD, lying in bed comfortably  HEAD:  Atraumatic, normocephalic  EYES: EOMI, PERRLA, conjunctiva and sclera clear  NECK: Supple, trachea midline, no JVD  HEART: Regular rate and rhythm, no murmurs, rubs, or gallops  LUNGS: Unlabored respirations.  Clear to auscultation bilaterally, no crackles, wheezing, or rhonchi  ABDOMEN: Soft, nontender, nondistended, +BS  EXTREMITIES: 2+ peripheral pulses bilaterally. No clubbing, cyanosis, or edema, R arm AVG  NERVOUS SYSTEM:  A&Ox3, moving all extremities, no focal deficits   SKIN: No rashes or lesions    MEDICATIONS:  MEDICATIONS  (STANDING):  ARIPiprazole 30 milliGRAM(s) Oral daily  atorvastatin 40 milliGRAM(s) Oral at bedtime  carvedilol 25 milliGRAM(s) Oral every 12 hours  chlorhexidine 2% Cloths 1 Application(s) Topical <User Schedule>  fluticasone propionate/ salmeterol 100-50 MICROgram(s) Diskus 1 Dose(s) Inhalation two times a day  heparin   Injectable 5000 Unit(s) SubCutaneous every 8 hours  hydrALAZINE 100 milliGRAM(s) Oral every 8 hours  nicotine - 21 mG/24Hr(s) Patch 1 Patch Transdermal daily  NIFEdipine XL 30 milliGRAM(s) Oral daily  piperacillin/tazobactam IVPB.. 3.375 Gram(s) IV Intermittent every 12 hours  polyethylene glycol 3350 17 Gram(s) Oral daily  senna 2 Tablet(s) Oral at bedtime  tamsulosin 0.4 milliGRAM(s) Oral at bedtime    MEDICATIONS  (PRN):  benzocaine/menthol Lozenge 1 Lozenge Oral four times a day PRN Sore Throat        Allergies  No Known Allergies            LABS:                        10.5   4.36  )-----------( 189      ( 16 Jul 2025 06:06 )             31.0     07-16    135  |  99  |  19  ----------------------------<  89  4.0   |  26  |  6.33[H]    Ca    8.3[L]      16 Jul 2025 06:06      Pro-Brain Natriuretic Peptide (07.13.25 @ 04:40)    Pro-Brain Natriuretic Peptide: 95738 pg/mL        Micro:    Culture - Blood (collected 07-12-25 @ 02:17)  Source: Blood Blood-Peripheral  Preliminary Report (07-15-25 @ 14:01):    No growth at 72 Hours    Culture - Blood (collected 07-12-25 @ 02:15)  Source: Blood Blood-Peripheral  Preliminary Report (07-15-25 @ 14:01):    No growth at 72 Hours        Culture - Sputum (collected 07-12-25 @ 10:00)  Source: ET Tube ET Tube  Gram Stain (07-13-25 @ 21:06):    Few Squamous epithelial cells per low power field    Few polymorphonuclear leukocytes per low power field    Few Gram positive cocci in pairs per oil power field  Final Report (07-13-25 @ 21:06):    Commensal jo ann consistent with body site        RADIOLOGY & ADDITIONAL TESTS: Reviewed.  < from: Xray Chest 1 View- PORTABLE-Urgent (Xray Chest 1 View- PORTABLE-Urgent .) (07.12.25 @ 07:06) >  ET tube in satisfactory position.  Enteric tube tip in stomach.  Heart is top normal in size.  Mild improvement in bilateral lung opacities due to improving congestion   and/or pneumonia.  No pleural effusion or pneumothorax.    IMPRESSION:    Mild improvement in pulmonary congestion and/or pneumonia.    ------------------------------  < from: CT Chest No Cont (07.12.25 @ 04:45) >  CHEST:  LUNGS AND LARGE AIRWAYS: Tracheostomy tube. Patent central airways.   Prominent bibasilar airspace opacities and to a lesser extent in the   bilateral upper lobes, left greater than right. Findings may represent   infection and/or pulmonary edema. Correlate clinically. Recommend   short-term follow-up noncontrast chest CT to assess for resolution.  PLEURA: Small bilateral effusions.  VESSELS: Within normal limits.  HEART: Heart size is normal. No pericardial effusion. Cardiac stent.  MEDIASTINUM AND ANILA: No lymphadenopathy.  CHEST WALL AND LOWER NECK: Within normal limits.    ABDOMEN AND PELVIS:  LIVER: Within normal limits.  BILE DUCTS: Normal caliber.  GALLBLADDER: Within normal limits.  SPLEEN: Within normal limits.  PANCREAS: Within normal limits.  ADRENALS: Within normal limits.  KIDNEYS/URETERS: Bilateral markedly enlarged polycystic kidneys. No   hydronephrosis. Small calcifications in several dense cysts likely   hemorrhagic or proteinaceous.    BLADDER: The urinary bladder is collapsed around a Guthrie catheter   limiting evaluation.  REPRODUCTIVE ORGANS: Prostate within normal limits.    BOWEL: No bowel obstruction. Appendix is normal.  PERITONEUM/RETROPERITONEUM: Within normal limits.  VESSELS: Atherosclerotic changes.  LYMPH NODES: No lymphadenopathy.  ABDOMINAL WALL: Within normal limits.  BONES: Within normal limits.    IMPRESSION:    Limited noncontrast examination.    Prominent bibasilar airspace opacities and to a lesser extent in the   bilateral upper lobes, left greater than right. Findings may represent   infection and/or pulmonary edema. Correlate clinically. Recommend   short-term follow-up noncontrast chest CT to assess for resolution.    Enlarged bilateral polycystic kidneys.    -------------------------  < from: TTE Echo Complete w/o Contrast w/ Doppler (07.12.25 @ 10:41) >  CONCLUSIONS:     1. Technically difficult image quality.   2. Left ventricular cavity is normal in size. Left ventricular systolic   function is low-normal with an ejectionfraction of 50 % by Diez's   method of disks.   3. There is mild (grade 1) left ventricular diastolic dysfunction.   4. Aortic root at the sinuses of Valsalva is normal in size, measuring   3.20 cm (indexed 1.81 cm/m²).   5. Mild mitral regurgitation.   6. Pulmonic valve was not well visualized.   7. Mild tricuspid regurgitation.   8. Trileaflet aortic valve with normal systolic excursion. No aortic   valve stenosis.   9. No evidence of aortic regurgitation.  10. Left pleural effusion noted.  11. Small pericardial effusion with no echocardiographic evidence of   tamponade physiology.

## 2025-07-16 NOTE — PROGRESS NOTE ADULT - NUTRITIONAL ASSESSMENT
This patient has been assessed with a concern for Malnutrition and has been determined to have a diagnosis/diagnoses of Moderate protein-calorie malnutrition.    This patient is being managed with:   Diet Soft and Bite Sized-  Halal  Entered: Jul 15 2025 11:23AM    The following pending diet order is being considered for treatment of Moderate protein-calorie malnutrition:  Diet Soft and Bite Sized-  Supplement Feeding Modality:  Oral  Nepro Cans or Servings Per Day:  1       Frequency:  Two Times a day  Entered: Jul 14 2025  2:59PM  
This patient has been assessed with a concern for Malnutrition and has been determined to have a diagnosis/diagnoses of Moderate protein-calorie malnutrition.    This patient is being managed with:   Diet Soft and Bite Sized-  Halal  Entered: Jul 15 2025 11:23AM    The following pending diet order is being considered for treatment of Moderate protein-calorie malnutrition:  Diet Soft and Bite Sized-  Supplement Feeding Modality:  Oral  Nepro Cans or Servings Per Day:  1       Frequency:  Two Times a day  Entered: Jul 14 2025  2:59PM  
This patient has been assessed with a concern for Malnutrition and has been determined to have a diagnosis/diagnoses of Moderate protein-calorie malnutrition.    This patient is being managed with:   Diet Soft and Bite Sized-  Entered: Jul 13 2025 11:14AM    The following pending diet order is being considered for treatment of Moderate protein-calorie malnutrition:  Diet Soft and Bite Sized-  Supplement Feeding Modality:  Oral  Nepro Cans or Servings Per Day:  1       Frequency:  Two Times a day  Entered: Jul 14 2025  2:59PM  
This patient has been assessed with a concern for Malnutrition and has been determined to have a diagnosis/diagnoses of Moderate protein-calorie malnutrition.    This patient is being managed with:   Diet Soft and Bite Sized-  Entered: Jul 13 2025 11:14AM    The following pending diet order is being considered for treatment of Moderate protein-calorie malnutrition:  Diet Soft and Bite Sized-  Supplement Feeding Modality:  Oral  Nepro Cans or Servings Per Day:  1       Frequency:  Two Times a day  Entered: Jul 14 2025  2:59PM

## 2025-07-16 NOTE — DISCHARGE NOTE NURSING/CASE MANAGEMENT/SOCIAL WORK - NSDCPEEMAIL_GEN_ALL_CORE
[facility] for Tobacco Control email [email or web address]

## 2025-07-16 NOTE — DISCHARGE NOTE NURSING/CASE MANAGEMENT/SOCIAL WORK - NSDCPEWEB_GEN_ALL_CORE
[facility] for Tobacco Control website --- [email or web address] website --- [email or web address]

## 2025-07-16 NOTE — DISCHARGE NOTE PROVIDER - NSDCMRMEDTOKEN_GEN_ALL_CORE_FT
Abilify 30 mg oral tablet: 1 tab(s) orally once a day  Albuterol (Eqv-ProAir HFA) 90 mcg/inh inhalation aerosol: 2 inhaled 2 times a day  aspirin 81 mg oral delayed release tablet: 1 tab(s) orally  carvedilol 25 mg oral tablet: 1 tab(s) orally 2 times a day  carvedilol 40 mg oral capsule, extended release: 1 cap(s) orally  Flomax 0.4 mg oral capsule: 1 cap(s) orally once a day  hydrALAZINE 100 mg oral tablet: 1 tab(s) orally 3 times a day  Lipitor 40 mg oral tablet: 1 tab(s) orally once a day  NIFEdipine (Eqv-Adalat CC) 30 mg oral tablet, extended release: 1 tab(s) orally once a day  Symbicort 160 mcg-4.5 mcg/inh inhalation aerosol: inhaled 2 times a day   Abilify 30 mg oral tablet: 1 tab(s) orally once a day  Albuterol (Eqv-ProAir HFA) 90 mcg/inh inhalation aerosol: 2 inhaled 2 times a day  aspirin 81 mg oral delayed release tablet: 1 tab(s) orally  carvedilol 25 mg oral tablet: 1 tab(s) orally 2 times a day  Flomax 0.4 mg oral capsule: 1 cap(s) orally once a day  hydrALAZINE 100 mg oral tablet: 1 tab(s) orally 3 times a day  Lipitor 40 mg oral tablet: 1 tab(s) orally once a day  nicotine 21 mg/24 hr transdermal film, extended release: 1 patch transdermal once a day  NIFEdipine (Eqv-Adalat CC) 30 mg oral tablet, extended release: 1 tab(s) orally once a day  polyethylene glycol 3350 oral powder for reconstitution: 17 gram(s) orally once a day as needed for  constipation  senna leaf extract oral tablet: 2 tab(s) orally once a day (at bedtime) as needed for  constipation  Symbicort 160 mcg-4.5 mcg/inh inhalation aerosol: inhaled 2 times a day

## 2025-07-16 NOTE — DISCHARGE NOTE NURSING/CASE MANAGEMENT/SOCIAL WORK - FINANCIAL ASSISTANCE
[facility] provides services at a reduced cost to those who are determined to be eligible through [facility]’s financial assistance program. Information regarding [facility]’s financial assistance program can be found by going to [email or web address] or by calling [phone].

## 2025-07-16 NOTE — DISCHARGE NOTE PROVIDER - HOSPITAL COURSE
59M w/ bipolar disease, HTN, CAD, HFrEF, COPD - active smoker 1 PPM, ESRD on HD due to PKD. Presents w/ SOB and cough. In ED, pt hypoxemic and hypertensive and febrile. Placed on BiPAP but had episode of vomiting and possible aspiration requiring for intubation. Imaging reveals bibasilar consolidations. PT admitted to icu and started on zosyn for empiric aspiration coverage. Pt S/P HD. Extubated. Pulm consulted for follow up.    Acute resp failure w/hypoxia intubated 7/12 for HRF 2/2 flash pulm edema.   Acute pulmonary edema  COPD  Acute on chronic diastolic CHF  Sepsis POA , 2/2 Gram-neg PNA  ESRD on HD.   Cont dialysis per renal  HTN  - Completed zosyn for 5 day course for aspiration PNA  - cont duonebs PRN for now for copd and active smoking with benefit of airway clearance.     Constipation  dulcolax suppository , Had BM   59M w/ bipolar disease, HTN, CAD, HFrEF, COPD - active smoker 1 PPM, ESRD on HD due to PKD. Presents w/ SOB and cough. In ED, pt hypoxemic and hypertensive and febrile. Placed on BiPAP but had episode of vomiting and possible aspiration requiring for intubation. Imaging reveals bibasilar consolidations. PT admitted to icu and started on zosyn for empiric aspiration coverage. Pt S/P HD. Extubated. Pulm consulted for follow up.    Acute resp failure w/hypoxia intubated 7/12 for HRF 2/2 flash pulm edema.   Acute pulmonary edema  COPD  Acute on chronic diastolic CHF  Sepsis POA , 2/2 Gram-neg PNA  ESRD on HD.   Cont dialysis per renal  HTN  - Completed zosyn for 5 day course for aspiration PNA  - Cont duonebs PRN for now for copd and active smoking with benefit of airway clearance.     Constipation  dulcolax suppository , Had BM

## 2025-07-16 NOTE — PROGRESS NOTE ADULT - ASSESSMENT
Assessment: 59M active smoker 1PPD x 40 years PMH HTN, HLD, CAD s/p PCI, HFmrEF, COPD (not oxygen dependent), polycystic kidney disease, ESRD on HD (T-Th-Sat), bipolar disease presents for SOB and cough. Hypoxic to the 60s and hypertensive 200s/120s with fever on presentation. Placed on BiPAP but had episode of vomiting and possible aspiration requiring intubation. Imaging reveals bibasilar consolidations. Admitted to ICU for further management. proBNP [id number]. Initial arterial blood gas 7.26/61/69/35/93%. No leukocytosis. s/p HD with 2.5L fluid removal on 7/12. Extubated 7/13 to RA and downgraded to medical service. Pulmonary consulted for follow up.    DX: acute hypoxic and hypercarbic respiratory failure, respiratory failure requiring intubation, acute on chronic HFmrEF, pulmonary congestion, aspiration PNA, hypertensive urgency, COPD, active smoker, ESRD on HD, polycystic kidney disease    Recs:  - weaned and extubated in ICU 7/13  - doing well on RA  - goal to maintain o2 sat > 90%  - needs RA sat with ambulation  - cont fluid removal with HD for underlying CHF  - HD per nephrology: s/p 3L fluid removal today  - on zosyn (day 5) for aspiration PNA  - 5 day course of antibx should be sufficient to treat PNA  - sputum cx with commensal jo ann  - strep/legionella negative  - symbicort and prn duonebs   - no official COPD diagnosis: will need outpatient pulmonary follow up and PFTs  - nicotine patch for smoking hx  - incentive spirometer  - remainder of care per primary team  - physical therapy, OOB to chair  - daughter states pt follows with [facility] and gets all of his care from there and are in the process of getting an [facility] affiliated pulmonologist.   - rest of management per primary team     felicitas raines

## 2025-07-17 LAB
CULTURE RESULTS: SIGNIFICANT CHANGE UP
CULTURE RESULTS: SIGNIFICANT CHANGE UP
SPECIMEN SOURCE: SIGNIFICANT CHANGE UP
SPECIMEN SOURCE: SIGNIFICANT CHANGE UP

## 2025-07-28 DIAGNOSIS — F31.9 BIPOLAR DISORDER, UNSPECIFIED: ICD-10-CM

## 2025-07-28 DIAGNOSIS — J69.0 PNEUMONITIS DUE TO INHALATION OF FOOD AND VOMIT: ICD-10-CM

## 2025-07-28 DIAGNOSIS — I13.2 HYPERTENSIVE HEART AND CHRONIC KIDNEY DISEASE WITH HEART FAILURE AND WITH STAGE 5 CHRONIC KIDNEY DISEASE, OR END STAGE RENAL DISEASE: ICD-10-CM

## 2025-07-28 DIAGNOSIS — A41.9 SEPSIS, UNSPECIFIED ORGANISM: ICD-10-CM

## 2025-07-28 DIAGNOSIS — J44.9 CHRONIC OBSTRUCTIVE PULMONARY DISEASE, UNSPECIFIED: ICD-10-CM

## 2025-07-28 DIAGNOSIS — I16.0 HYPERTENSIVE URGENCY: ICD-10-CM

## 2025-07-28 DIAGNOSIS — N18.6 END STAGE RENAL DISEASE: ICD-10-CM

## 2025-07-28 DIAGNOSIS — R53.1 WEAKNESS: ICD-10-CM

## 2025-07-28 DIAGNOSIS — F17.200 NICOTINE DEPENDENCE, UNSPECIFIED, UNCOMPLICATED: ICD-10-CM

## 2025-07-28 DIAGNOSIS — E44.0 MODERATE PROTEIN-CALORIE MALNUTRITION: ICD-10-CM

## 2025-07-28 DIAGNOSIS — I25.10 ATHEROSCLEROTIC HEART DISEASE OF NATIVE CORONARY ARTERY WITHOUT ANGINA PECTORIS: ICD-10-CM

## 2025-07-28 DIAGNOSIS — I50.23 ACUTE ON CHRONIC SYSTOLIC (CONGESTIVE) HEART FAILURE: ICD-10-CM

## 2025-07-28 DIAGNOSIS — Z99.2 DEPENDENCE ON RENAL DIALYSIS: ICD-10-CM

## 2025-07-28 DIAGNOSIS — E78.5 HYPERLIPIDEMIA, UNSPECIFIED: ICD-10-CM

## 2025-07-28 DIAGNOSIS — Z95.0 PRESENCE OF CARDIAC PACEMAKER: ICD-10-CM

## 2025-07-28 DIAGNOSIS — Q61.3 POLYCYSTIC KIDNEY, UNSPECIFIED: ICD-10-CM

## 2025-07-28 DIAGNOSIS — J15.69 PNEUMONIA DUE TO OTHER GRAM-NEGATIVE BACTERIA: ICD-10-CM

## 2025-07-28 DIAGNOSIS — J96.02 ACUTE RESPIRATORY FAILURE WITH HYPERCAPNIA: ICD-10-CM

## 2025-07-28 DIAGNOSIS — N40.0 BENIGN PROSTATIC HYPERPLASIA WITHOUT LOWER URINARY TRACT SYMPTOMS: ICD-10-CM

## 2025-07-28 DIAGNOSIS — K59.00 CONSTIPATION, UNSPECIFIED: ICD-10-CM

## 2025-07-28 DIAGNOSIS — E11.22 TYPE 2 DIABETES MELLITUS WITH DIABETIC CHRONIC KIDNEY DISEASE: ICD-10-CM

## 2025-07-28 DIAGNOSIS — J96.01 ACUTE RESPIRATORY FAILURE WITH HYPOXIA: ICD-10-CM
